# Patient Record
Sex: FEMALE | Race: WHITE | NOT HISPANIC OR LATINO | URBAN - METROPOLITAN AREA
[De-identification: names, ages, dates, MRNs, and addresses within clinical notes are randomized per-mention and may not be internally consistent; named-entity substitution may affect disease eponyms.]

---

## 2019-11-29 ENCOUNTER — OUTPATIENT (OUTPATIENT)
Dept: OUTPATIENT SERVICES | Facility: HOSPITAL | Age: 69
LOS: 1 days | End: 2019-11-29
Payer: MEDICARE

## 2019-11-29 VITALS
DIASTOLIC BLOOD PRESSURE: 82 MMHG | RESPIRATION RATE: 16 BRPM | WEIGHT: 167.99 LBS | TEMPERATURE: 98 F | OXYGEN SATURATION: 98 % | SYSTOLIC BLOOD PRESSURE: 144 MMHG | HEIGHT: 62.5 IN | HEART RATE: 84 BPM

## 2019-11-29 DIAGNOSIS — M25.511 PAIN IN RIGHT SHOULDER: ICD-10-CM

## 2019-11-29 DIAGNOSIS — Z85.828 PERSONAL HISTORY OF OTHER MALIGNANT NEOPLASM OF SKIN: Chronic | ICD-10-CM

## 2019-11-29 DIAGNOSIS — S46.011D STRAIN OF MUSCLE(S) AND TENDON(S) OF THE ROTATOR CUFF OF RIGHT SHOULDER, SUBSEQUENT ENCOUNTER: ICD-10-CM

## 2019-11-29 DIAGNOSIS — S52.122A DISPLACED FRACTURE OF HEAD OF LEFT RADIUS, INITIAL ENCOUNTER FOR CLOSED FRACTURE: Chronic | ICD-10-CM

## 2019-11-29 DIAGNOSIS — Z98.890 OTHER SPECIFIED POSTPROCEDURAL STATES: Chronic | ICD-10-CM

## 2019-11-29 DIAGNOSIS — Z01.818 ENCOUNTER FOR OTHER PREPROCEDURAL EXAMINATION: ICD-10-CM

## 2019-11-29 PROCEDURE — G0463: CPT

## 2019-11-29 NOTE — H&P PST ADULT - NSICDXPASTMEDICALHX_GEN_ALL_CORE_FT
PAST MEDICAL HISTORY:  Depression     Dyslipidemia     EKG abnormality for nuclear ST on 12/2    Hypertension     Hypothyroid     Pain in right shoulder     Rheumatoid arthritis mainly lower back area    Snores negative sleep study    Squamous cell carcinoma of skin

## 2019-11-29 NOTE — H&P PST ADULT - MUSCULOSKELETAL
details… detailed exam decreased ROM/no calf tenderness/decreased ROM due to pain/diminished strength

## 2019-11-29 NOTE — H&P PST ADULT - NSANTHOSAYNRD_GEN_A_CORE
No. ANNMARIE screening performed.  STOP BANG Legend: 0-2 = LOW Risk; 3-4 = INTERMEDIATE Risk; 5-8 = HIGH Risk

## 2019-11-29 NOTE — H&P PST ADULT - NSICDXPROBLEM_GEN_ALL_CORE_FT
PROBLEM DIAGNOSES  Problem: Pain in right shoulder  Assessment and Plan: RIGHT SHOULDER ARTHROSCOOPY  MEDICAL CLEARANCE  CARDIAC CLEARANCE  PRE OP INSTRUCTIONS      R/O PROBLEM DIAGNOSES  Problem: EKG abnormality  Assessment and Plan: STRESS TEST 12/2/19  CARDIAC CLEARANCE

## 2019-11-29 NOTE — H&P PST ADULT - NSICDXPASTSURGICALHX_GEN_ALL_CORE_FT
PAST SURGICAL HISTORY:  H/O Mohs micrographic surgery for skin cancer     H/O rhytidectomy 2000    Left radial head fracture removal of radial head 1980

## 2019-11-29 NOTE — H&P PST ADULT - ATTENDING COMMENTS
The patient has a right shoulder rotator cuff tear. I recommend a right shoulder arthroscopy with cuff repair and biceps tenotomy, with possible superior capsular reconstruction if the tear is irreparable. The risks of the procedure, including bleeding, infection, stiffness, retear, and need for additional surgery. Benefits, including decreased pain and improved function, were also reviewed. The patient understands the risks and benefits and wishes to proceed with surgery.

## 2019-11-29 NOTE — H&P PST ADULT - HISTORY OF PRESENT ILLNESS
68 yo female presents with over 2 year history of intermittent right shoulder pain.  Pain was exacerbated by a recent fall [place] the stairs at hop.  MRI was + for a rotator cuff tear.  Patient states that pain is intermittent and increases with any attempt at elevation.  No current analgesics.

## 2019-11-29 NOTE — H&P PST ADULT - NSICDXFAMILYHX_GEN_ALL_CORE_FT
FAMILY HISTORY:  Father  Still living? No  FHx: colon cancer, Age at diagnosis: Age Unknown    Sibling  Still living? No  FHx: heart disease, Age at diagnosis: Age Unknown

## 2019-12-03 PROBLEM — Z00.00 ENCOUNTER FOR PREVENTIVE HEALTH EXAMINATION: Status: ACTIVE | Noted: 2019-12-03

## 2019-12-09 ENCOUNTER — TRANSCRIPTION ENCOUNTER (OUTPATIENT)
Age: 69
End: 2019-12-09

## 2019-12-09 NOTE — ASU PATIENT PROFILE, ADULT - PMH
Depression    Dyslipidemia    EKG abnormality  for nuclear ST on 12/2  Hypertension    Hypothyroid    Pain in right shoulder    Rheumatoid arthritis  mainly lower back area  Snores  negative sleep study  Squamous cell carcinoma of skin

## 2019-12-09 NOTE — ASU PATIENT PROFILE, ADULT - PSH
H/O Mohs micrographic surgery for skin cancer    H/O rhytidectomy  2000  Left radial head fracture  removal of radial head 1980

## 2019-12-10 ENCOUNTER — OUTPATIENT (OUTPATIENT)
Dept: OUTPATIENT SERVICES | Facility: HOSPITAL | Age: 69
LOS: 1 days | End: 2019-12-10
Payer: MEDICARE

## 2019-12-10 ENCOUNTER — RESULT REVIEW (OUTPATIENT)
Age: 69
End: 2019-12-10

## 2019-12-10 VITALS
SYSTOLIC BLOOD PRESSURE: 146 MMHG | WEIGHT: 168.87 LBS | HEART RATE: 86 BPM | TEMPERATURE: 98 F | DIASTOLIC BLOOD PRESSURE: 65 MMHG | HEIGHT: 62 IN | OXYGEN SATURATION: 100 % | RESPIRATION RATE: 10 BRPM

## 2019-12-10 VITALS
SYSTOLIC BLOOD PRESSURE: 121 MMHG | RESPIRATION RATE: 11 BRPM | OXYGEN SATURATION: 97 % | HEART RATE: 82 BPM | DIASTOLIC BLOOD PRESSURE: 58 MMHG

## 2019-12-10 DIAGNOSIS — S46.011D STRAIN OF MUSCLE(S) AND TENDON(S) OF THE ROTATOR CUFF OF RIGHT SHOULDER, SUBSEQUENT ENCOUNTER: ICD-10-CM

## 2019-12-10 DIAGNOSIS — Z98.890 OTHER SPECIFIED POSTPROCEDURAL STATES: Chronic | ICD-10-CM

## 2019-12-10 DIAGNOSIS — Z85.828 PERSONAL HISTORY OF OTHER MALIGNANT NEOPLASM OF SKIN: Chronic | ICD-10-CM

## 2019-12-10 DIAGNOSIS — S52.122A DISPLACED FRACTURE OF HEAD OF LEFT RADIUS, INITIAL ENCOUNTER FOR CLOSED FRACTURE: Chronic | ICD-10-CM

## 2019-12-10 DIAGNOSIS — S48.011D: ICD-10-CM

## 2019-12-10 DIAGNOSIS — Z01.818 ENCOUNTER FOR OTHER PREPROCEDURAL EXAMINATION: ICD-10-CM

## 2019-12-10 PROCEDURE — 88304 TISSUE EXAM BY PATHOLOGIST: CPT

## 2019-12-10 PROCEDURE — 88304 TISSUE EXAM BY PATHOLOGIST: CPT | Mod: 26

## 2019-12-10 PROCEDURE — 29827 SHO ARTHRS SRG RT8TR CUF RPR: CPT | Mod: RT

## 2019-12-10 PROCEDURE — C1713: CPT

## 2019-12-10 RX ORDER — GLUCOSAMINE/MSM/CHONDROITIN A 750-375MG
0 TABLET ORAL
Qty: 0 | Refills: 0 | DISCHARGE

## 2019-12-10 RX ORDER — CHOLECALCIFEROL (VITAMIN D3) 125 MCG
1 CAPSULE ORAL
Qty: 0 | Refills: 0 | DISCHARGE

## 2019-12-10 RX ORDER — ESCITALOPRAM OXALATE 10 MG/1
1 TABLET, FILM COATED ORAL
Qty: 0 | Refills: 0 | DISCHARGE

## 2019-12-10 RX ORDER — ASCORBIC ACID 60 MG
1 TABLET,CHEWABLE ORAL
Qty: 0 | Refills: 0 | DISCHARGE

## 2019-12-10 RX ORDER — BENZOYL PEROXIDE MICRONIZED 5.8 %
1 TOWELETTE (EA) TOPICAL
Qty: 0 | Refills: 0 | DISCHARGE

## 2019-12-10 RX ORDER — UBIDECARENONE 100 MG
1 CAPSULE ORAL
Qty: 0 | Refills: 0 | DISCHARGE

## 2019-12-10 RX ORDER — OXYCODONE HYDROCHLORIDE 5 MG/1
5 TABLET ORAL ONCE
Refills: 0 | Status: DISCONTINUED | OUTPATIENT
Start: 2019-12-10 | End: 2019-12-10

## 2019-12-10 RX ORDER — APREPITANT 80 MG/1
40 CAPSULE ORAL ONCE
Refills: 0 | Status: COMPLETED | OUTPATIENT
Start: 2019-12-10 | End: 2019-12-10

## 2019-12-10 RX ORDER — CEFAZOLIN SODIUM 1 G
2000 VIAL (EA) INJECTION ONCE
Refills: 0 | Status: COMPLETED | OUTPATIENT
Start: 2019-12-10 | End: 2019-12-10

## 2019-12-10 RX ORDER — CHOLECALCIFEROL (VITAMIN D3) 125 MCG
50000 CAPSULE ORAL
Qty: 0 | Refills: 0 | DISCHARGE

## 2019-12-10 RX ORDER — ROSUVASTATIN CALCIUM 5 MG/1
1 TABLET ORAL
Qty: 0 | Refills: 0 | DISCHARGE

## 2019-12-10 RX ORDER — OMEPRAZOLE 10 MG/1
1 CAPSULE, DELAYED RELEASE ORAL
Qty: 0 | Refills: 0 | DISCHARGE

## 2019-12-10 RX ORDER — LAMOTRIGINE 25 MG/1
1 TABLET, ORALLY DISINTEGRATING ORAL
Qty: 0 | Refills: 0 | DISCHARGE

## 2019-12-10 RX ORDER — LEVOTHYROXINE SODIUM 125 MCG
1 TABLET ORAL
Qty: 0 | Refills: 0 | DISCHARGE

## 2019-12-10 RX ORDER — ONDANSETRON 8 MG/1
4 TABLET, FILM COATED ORAL ONCE
Refills: 0 | Status: DISCONTINUED | OUTPATIENT
Start: 2019-12-10 | End: 2019-12-10

## 2019-12-10 RX ORDER — NORTRIPTYLINE HYDROCHLORIDE 10 MG/1
1 CAPSULE ORAL
Qty: 0 | Refills: 0 | DISCHARGE

## 2019-12-10 RX ORDER — LANOLIN ALCOHOL/MO/W.PET/CERES
1 CREAM (GRAM) TOPICAL
Qty: 0 | Refills: 0 | DISCHARGE

## 2019-12-10 RX ORDER — RAMIPRIL 5 MG
1 CAPSULE ORAL
Qty: 0 | Refills: 0 | DISCHARGE

## 2019-12-10 RX ORDER — SODIUM CHLORIDE 9 MG/ML
1000 INJECTION, SOLUTION INTRAVENOUS
Refills: 0 | Status: DISCONTINUED | OUTPATIENT
Start: 2019-12-10 | End: 2019-12-10

## 2019-12-10 RX ORDER — VITAMIN E 100 UNIT
1 CAPSULE ORAL
Qty: 0 | Refills: 0 | DISCHARGE

## 2019-12-10 RX ORDER — HYDROMORPHONE HYDROCHLORIDE 2 MG/ML
0.5 INJECTION INTRAMUSCULAR; INTRAVENOUS; SUBCUTANEOUS
Refills: 0 | Status: DISCONTINUED | OUTPATIENT
Start: 2019-12-10 | End: 2019-12-10

## 2019-12-10 RX ORDER — FOLIC ACID 0.8 MG
1 TABLET ORAL
Qty: 0 | Refills: 0 | DISCHARGE

## 2019-12-10 RX ORDER — CHLORHEXIDINE GLUCONATE 213 G/1000ML
1 SOLUTION TOPICAL ONCE
Refills: 0 | Status: COMPLETED | OUTPATIENT
Start: 2019-12-10 | End: 2019-12-10

## 2019-12-10 RX ORDER — LACTOBACILLUS ACIDOPHILUS 100MM CELL
1 CAPSULE ORAL
Qty: 0 | Refills: 0 | DISCHARGE

## 2019-12-10 RX ORDER — TOCILIZUMAB 20 MG/ML
0 INJECTION, SOLUTION, CONCENTRATE INTRAVENOUS
Qty: 0 | Refills: 0 | DISCHARGE

## 2019-12-10 RX ADMIN — CHLORHEXIDINE GLUCONATE 1 APPLICATION(S): 213 SOLUTION TOPICAL at 11:09

## 2019-12-10 RX ADMIN — SODIUM CHLORIDE 100 MILLILITER(S): 9 INJECTION, SOLUTION INTRAVENOUS at 15:42

## 2019-12-10 RX ADMIN — APREPITANT 40 MILLIGRAM(S): 80 CAPSULE ORAL at 11:09

## 2019-12-10 NOTE — ASU DISCHARGE PLAN (ADULT/PEDIATRIC) - CARE PROVIDER_API CALL
Milo Bhatia)  Orthopaedic Surgery  [address] [place] [address]  Phone: [phone]  Fax: [phone]  Follow Up Time:

## 2019-12-10 NOTE — ASU DISCHARGE PLAN (ADULT/PEDIATRIC) - ASU DC SPECIAL INSTRUCTIONSFT
Follow instructions in the attached brochure for ice packs, bandage care, & shoulder exercises.  Elevate the right arm in sling daily.   You may take sling off to take a shower after follow up appointment.  May open and close fingers, and bend wrist up and down.  Apply ice packs to the surgical shoulder for 30 minutes every 2 or 3 hours daily.  Start Physical Therapy as prescribed as soon as possible.  Removal of stitches in 10-14 days in Dr. Bhatia's office.  Please call to arrange.    - Call your doctor if you experience:  • An increase in pain not controlled by pain medication or change in activity or position.  • Temperature greater than 101° F.  • Redness, increased swelling or foul smelling drainage from or around the incision.  • Numbness, tingling or a change in color or temperature of the operative extremity.  • Call your doctor immediately if you experience chest pain, shortness of breath or calf pain.

## 2019-12-10 NOTE — ASU DISCHARGE PLAN (ADULT/PEDIATRIC) - CALL YOUR DOCTOR IF YOU HAVE ANY OF THE FOLLOWING:
Increased irritability or sluggishness/Fever greater than (need to indicate Fahrenheit or Celsius)/Wound/Surgical Site with redness, or foul smelling discharge or pus/Numbness, tingling, color or temperature change to extremity/Excessive diarrhea/Inability to tolerate liquids or foods/Bleeding that does not stop/Nausea and vomiting that does not stop/Unable to urinate/Swelling that gets worse/Pain not relieved by Medications

## 2021-04-21 ENCOUNTER — TRANSCRIPTION ENCOUNTER (OUTPATIENT)
Age: 71
End: 2021-04-21

## 2022-03-23 NOTE — ASU PATIENT PROFILE, ADULT - NS TRANSFER DENTURES
none You can access the FollowMyHealth Patient Portal offered by [facility] by registering at the following website: [email or web address]. By joining [facility]’s FollowMyHealth portal, you will also be able to view your health information using other applications (apps) compatible with our system.

## 2022-12-07 ENCOUNTER — OFFICE (OUTPATIENT)
Dept: URBAN - METROPOLITAN AREA CLINIC 70 | Facility: CLINIC | Age: 72
Setting detail: OPHTHALMOLOGY
End: 2022-12-07
Payer: MEDICARE

## 2022-12-07 DIAGNOSIS — H16.223: ICD-10-CM

## 2022-12-07 DIAGNOSIS — H25.13: ICD-10-CM

## 2022-12-07 DIAGNOSIS — H02.403: ICD-10-CM

## 2022-12-07 PROBLEM — H40.033 NARROW ANGLE GLAUCOMA SUSPECT; BOTH EYES: Status: ACTIVE | Noted: 2022-12-07

## 2022-12-07 PROBLEM — H40.013 GLAUCOMA SUSPECT, LOW RISK 1-2 FACTORS; BOTH EYES: Status: ACTIVE | Noted: 2022-12-07

## 2022-12-07 PROCEDURE — 99213 OFFICE O/P EST LOW 20 MIN: CPT | Performed by: OPHTHALMOLOGY

## 2022-12-07 ASSESSMENT — REFRACTION_MANIFEST
OS_VA1: 20/40
OD_VA1: 20/40
OS_CYLINDER: -2.00
OS_SPHERE: +3.00
OS_ADD: +1.75
OD_ADD: +1.75
OD_AXIS: 091
OD_SPHERE: +3.00
OD_CYLINDER: -3.25
OS_AXIS: 070

## 2022-12-07 ASSESSMENT — REFRACTION_AUTOREFRACTION
OS_AXIS: 070
OD_AXIS: 091
OS_CYLINDER: -2.00
OS_SPHERE: +3.00
OD_CYLINDER: -3.75
OD_SPHERE: +3.50

## 2022-12-07 ASSESSMENT — SPHEQUIV_DERIVED
OD_SPHEQUIV: 1.375
OS_SPHEQUIV: 2
OS_SPHEQUIV: 2
OD_SPHEQUIV: 1.625

## 2022-12-07 ASSESSMENT — LID EXAM ASSESSMENTS
OS_MRD1: 3.5 MM
OS_CENTRAL_FAT_PROLAPSE: 2+
OS_COMMENTS: 2+ MEDIAL
OD_COMMENTS: 2+ MEDIAL
OS_FRONTALIS_USE: 3+
OD_COMMENTS: 2+ LATERAL
OS_COMMENTS: 2+ LATERAL FAT PROLAPS
OS_COMMENTS: 2+ MEDIAL FAT PROLAPSE
OS_COMMENTS: 2+ LATERAL
OD_MRD1: 2.5 MM
OS_LEVATOR_FUNCTION: 18 MM
OD_CENTRAL_FAT_PROLAPSE: 2+
OD_COMMENTS: 2+ LATERAL FAT PROLAPS
OD_LEVATOR_FUNCTION: 18 MM
OS_LATERAL_FAT_PROLAPSE: 2+
OD_MEDIAL_FAT_PROLAPSE: 2+
OD_LATERAL_FAT_PROLAPSE: 2+

## 2022-12-07 ASSESSMENT — KERATOMETRY
OD_K2POWER_DIOPTERS: 44.25
OS_AXISANGLE_DEGREES: 133
OS_K2POWER_DIOPTERS: 45.00
OS_K1POWER_DIOPTERS: 43.50
OD_CYLPOWER_DEGREES: 2.25
OD_K1POWER_DIOPTERS: 42.00
OS_AXISANGLE2_DEGREES: 133
OD_K2POWER_DIOPTERS: 44.25
OS_CYLAXISANGLE_DEGREES: 133
OS_CYLPOWER_DEGREES: 1.5
OD_K1K2_AVERAGE: 43.125
OS_K2POWER_DIOPTERS: 45.00
OD_K1POWER_DIOPTERS: 42.00
OS_K1K2_AVERAGE: 44.25
OD_AXISANGLE_DEGREES: 002
OS_AXISANGLE_DEGREES: 133
OD_AXISANGLE2_DEGREES: 002
OD_AXISANGLE_DEGREES: 002
OD_CYLAXISANGLE_DEGREES: 2
OS_K1POWER_DIOPTERS: 43.50

## 2022-12-07 ASSESSMENT — LID POSITION - PTOSIS
OD_PTOSIS: RUL
OS_PTOSIS: LUL

## 2022-12-07 ASSESSMENT — VISUAL ACUITY
OS_BCVA: 20/30-2
OD_BCVA: 20/40

## 2022-12-07 ASSESSMENT — DRY EYES - PHYSICIAN NOTES
OD_GENERALCOMMENTS: L
OS_GENERALCOMMENTS: L

## 2022-12-07 ASSESSMENT — REFRACTION_CURRENTRX
OS_CYLINDER: -0.75
OS_AXIS: 075
OD_CYLINDER: -0.50
OD_SPHERE: PL
OS_OVR_VA: 20/
OD_OVR_VA: 20/
OS_SPHERE: +1.50
OD_AXIS: 108

## 2022-12-07 ASSESSMENT — AXIALLENGTH_DERIVED
OS_AL: 22.5823
OS_AL: 22.5823
OD_AL: 23.2005
OD_AL: 23.1067

## 2022-12-07 ASSESSMENT — SUPERFICIAL PUNCTATE KERATITIS (SPK)
OD_SPK: 2+
OS_SPK: 2+

## 2022-12-07 ASSESSMENT — CONFRONTATIONAL VISUAL FIELD TEST (CVF)
OS_FINDINGS: FULL
OD_FINDINGS: FULL

## 2022-12-07 ASSESSMENT — LID POSITION - COMMENTS: OD_COMMENTS: MILD

## 2023-02-08 ENCOUNTER — OFFICE (OUTPATIENT)
Dept: URBAN - METROPOLITAN AREA CLINIC 70 | Facility: CLINIC | Age: 73
Setting detail: OPHTHALMOLOGY
End: 2023-02-08
Payer: MEDICARE

## 2023-02-08 DIAGNOSIS — H40.033: ICD-10-CM

## 2023-02-08 DIAGNOSIS — H40.013: ICD-10-CM

## 2023-02-08 DIAGNOSIS — H02.403: ICD-10-CM

## 2023-02-08 DIAGNOSIS — H16.223: ICD-10-CM

## 2023-02-08 DIAGNOSIS — H25.13: ICD-10-CM

## 2023-02-08 PROCEDURE — 99213 OFFICE O/P EST LOW 20 MIN: CPT | Performed by: OPHTHALMOLOGY

## 2023-02-08 ASSESSMENT — REFRACTION_MANIFEST
OS_ADD: +1.75
OS_VA1: 20/40
OS_SPHERE: +3.00
OD_ADD: +1.75
OD_VA1: 20/40
OS_CYLINDER: -2.00
OD_SPHERE: +3.00
OD_AXIS: 091
OS_AXIS: 070
OD_CYLINDER: -3.25

## 2023-02-08 ASSESSMENT — KERATOMETRY
OS_AXISANGLE_DEGREES: 144
OD_K2POWER_DIOPTERS: 44.00
OS_K1POWER_DIOPTERS: 43.50
OS_K2POWER_DIOPTERS: 44.75
OD_AXISANGLE_DEGREES: 009
OD_K1POWER_DIOPTERS: 43.25

## 2023-02-08 ASSESSMENT — LID EXAM ASSESSMENTS
OD_COMMENTS: 2+ LATERAL
OS_MRD1: 3.5 MM
OS_LEVATOR_FUNCTION: 18 MM
OS_LATERAL_FAT_PROLAPSE: 2+
OS_COMMENTS: 2+ MEDIAL
OD_COMMENTS: 2+ LATERAL FAT PROLAPS
OS_COMMENTS: 2+ LATERAL FAT PROLAPS
OS_COMMENTS: 2+ MEDIAL FAT PROLAPSE
OS_COMMENTS: 2+ LATERAL
OS_CENTRAL_FAT_PROLAPSE: 2+
OS_FRONTALIS_USE: 3+
OD_LATERAL_FAT_PROLAPSE: 2+
OD_COMMENTS: 2+ MEDIAL
OD_LEVATOR_FUNCTION: 18 MM
OD_MEDIAL_FAT_PROLAPSE: 2+
OD_MRD1: 2.5 MM
OD_CENTRAL_FAT_PROLAPSE: 2+

## 2023-02-08 ASSESSMENT — REFRACTION_AUTOREFRACTION
OS_CYLINDER: -2.25
OS_SPHERE: ++3.00
OD_SPHERE: +1.75
OD_AXIS: 96
OS_AXIS: 76
OD_CYLINDER: -2.25

## 2023-02-08 ASSESSMENT — DRY EYES - PHYSICIAN NOTES
OD_GENERALCOMMENTS: L
OS_GENERALCOMMENTS: L

## 2023-02-08 ASSESSMENT — LID POSITION - PTOSIS
OD_PTOSIS: RUL
OS_PTOSIS: LUL

## 2023-02-08 ASSESSMENT — SUPERFICIAL PUNCTATE KERATITIS (SPK)
OS_SPK: 2+
OD_SPK: 2+

## 2023-02-08 ASSESSMENT — REFRACTION_CURRENTRX
OS_OVR_VA: 20/
OS_AXIS: 075
OD_SPHERE: PL
OD_CYLINDER: -0.50
OD_OVR_VA: 20/
OS_SPHERE: +1.50
OD_AXIS: 108
OS_CYLINDER: -0.75

## 2023-02-08 ASSESSMENT — LID POSITION - COMMENTS: OD_COMMENTS: MILD

## 2023-02-08 ASSESSMENT — VISUAL ACUITY
OD_BCVA: 20/60
OS_BCVA: 20/40+2

## 2023-02-08 ASSESSMENT — AXIALLENGTH_DERIVED
OS_AL: 22.6245
OD_AL: 23.0243
OD_AL: 23.306

## 2023-02-08 ASSESSMENT — SPHEQUIV_DERIVED
OD_SPHEQUIV: 0.625
OD_SPHEQUIV: 1.375
OS_SPHEQUIV: 2

## 2023-02-08 ASSESSMENT — CONFRONTATIONAL VISUAL FIELD TEST (CVF)
OS_FINDINGS: FULL
OD_FINDINGS: FULL

## 2023-02-22 ENCOUNTER — NON-APPOINTMENT (OUTPATIENT)
Age: 73
End: 2023-02-22

## 2023-02-23 ENCOUNTER — TRANSCRIPTION ENCOUNTER (OUTPATIENT)
Age: 73
End: 2023-02-23

## 2023-03-13 ENCOUNTER — OFFICE (OUTPATIENT)
Dept: URBAN - METROPOLITAN AREA CLINIC 104 | Facility: CLINIC | Age: 73
Setting detail: OPHTHALMOLOGY
End: 2023-03-13
Payer: MEDICARE

## 2023-03-13 DIAGNOSIS — H53.2: ICD-10-CM

## 2023-03-13 PROCEDURE — 92015 DETERMINE REFRACTIVE STATE: CPT | Performed by: SPECIALIST

## 2023-03-13 PROCEDURE — 99213 OFFICE O/P EST LOW 20 MIN: CPT | Performed by: SPECIALIST

## 2023-03-13 ASSESSMENT — LID POSITION - PTOSIS
OD_PTOSIS: RUL
OS_PTOSIS: LUL

## 2023-03-13 ASSESSMENT — REFRACTION_MANIFEST
OS_SPHERE: +2.00
OS_VA1: 20/30-
OS_CYLINDER: -1.00
OS_HPRISM: 2
OD_VA1: 20/25+
OS_VPRISM_DIRECTION: BI
OD_SPHERE: PLANO
OS_AXIS: 90
OD_VA2: 20/20(J1+)
OD_AXIS: 105
OS_VA2: 20/20(J1+)
OD_CYLINDER: -1.00

## 2023-03-13 ASSESSMENT — LID EXAM ASSESSMENTS
OD_MRD1: 2.5 MM
OD_COMMENTS: 2+ LATERAL FAT PROLAPS
OD_COMMENTS: 2+ MEDIAL
OS_FRONTALIS_USE: 3+
OS_COMMENTS: 2+ LATERAL FAT PROLAPS
OS_MRD1: 3.5 MM
OD_LATERAL_FAT_PROLAPSE: 2+
OD_COMMENTS: 2+ LATERAL
OS_COMMENTS: 2+ MEDIAL FAT PROLAPSE
OS_CENTRAL_FAT_PROLAPSE: 2+
OS_COMMENTS: 2+ LATERAL
OS_LATERAL_FAT_PROLAPSE: 2+
OS_LEVATOR_FUNCTION: 18 MM
OD_CENTRAL_FAT_PROLAPSE: 2+
OD_LEVATOR_FUNCTION: 18 MM
OD_MEDIAL_FAT_PROLAPSE: 2+
OS_COMMENTS: 2+ MEDIAL

## 2023-03-13 ASSESSMENT — SPHEQUIV_DERIVED
OS_SPHEQUIV: 1.5
OS_SPHEQUIV: 1.125
OD_SPHEQUIV: 1.25

## 2023-03-13 ASSESSMENT — REFRACTION_AUTOREFRACTION
OD_CYLINDER: -3.00
OS_AXIS: 072
OD_SPHERE: +2.75
OS_SPHERE: +2.25
OS_CYLINDER: -2.25
OD_AXIS: 090

## 2023-03-13 ASSESSMENT — DRY EYES - PHYSICIAN NOTES
OD_GENERALCOMMENTS: L
OS_GENERALCOMMENTS: L

## 2023-03-13 ASSESSMENT — VISUAL ACUITY
OS_BCVA: 20/40+2
OD_BCVA: 20/30

## 2023-03-13 ASSESSMENT — REFRACTION_CURRENTRX
OD_AXIS: 110
OS_SPHERE: +1.50
OD_SPHERE: PLANO
OS_OVR_VA: 20/
OD_OVR_VA: 20/
OS_AXIS: 80
OS_CYLINDER: -0.75
OD_CYLINDER: -0.50

## 2023-03-13 ASSESSMENT — SUPERFICIAL PUNCTATE KERATITIS (SPK)
OD_SPK: 2+
OS_SPK: 2+

## 2023-03-13 ASSESSMENT — LID POSITION - COMMENTS: OD_COMMENTS: MILD

## 2023-03-13 ASSESSMENT — CONFRONTATIONAL VISUAL FIELD TEST (CVF)
OS_FINDINGS: FULL
OD_FINDINGS: FULL

## 2023-04-25 ENCOUNTER — NON-APPOINTMENT (OUTPATIENT)
Age: 73
End: 2023-04-25

## 2023-05-05 PROBLEM — M25.511 PAIN IN RIGHT SHOULDER: Chronic | Status: ACTIVE | Noted: 2019-11-29

## 2023-05-05 PROBLEM — C44.92 SQUAMOUS CELL CARCINOMA OF SKIN, UNSPECIFIED: Chronic | Status: ACTIVE | Noted: 2019-11-29

## 2023-05-05 PROBLEM — F32.9 MAJOR DEPRESSIVE DISORDER, SINGLE EPISODE, UNSPECIFIED: Chronic | Status: ACTIVE | Noted: 2019-11-29

## 2023-05-05 PROBLEM — I10 ESSENTIAL (PRIMARY) HYPERTENSION: Chronic | Status: ACTIVE | Noted: 2019-11-29

## 2023-05-05 PROBLEM — E78.5 HYPERLIPIDEMIA, UNSPECIFIED: Chronic | Status: ACTIVE | Noted: 2019-11-29

## 2023-05-05 PROBLEM — R94.31 ABNORMAL ELECTROCARDIOGRAM [ECG] [EKG]: Chronic | Status: ACTIVE | Noted: 2019-11-29

## 2023-05-05 PROBLEM — R06.83 SNORING: Chronic | Status: ACTIVE | Noted: 2019-11-29

## 2023-05-05 PROBLEM — M06.9 RHEUMATOID ARTHRITIS, UNSPECIFIED: Chronic | Status: ACTIVE | Noted: 2019-11-29

## 2023-05-05 PROBLEM — E03.9 HYPOTHYROIDISM, UNSPECIFIED: Chronic | Status: ACTIVE | Noted: 2019-11-29

## 2023-06-24 ENCOUNTER — OFFICE (OUTPATIENT)
Dept: URBAN - METROPOLITAN AREA CLINIC 70 | Facility: CLINIC | Age: 73
Setting detail: OPHTHALMOLOGY
End: 2023-06-24
Payer: MEDICARE

## 2023-06-24 DIAGNOSIS — H40.033: ICD-10-CM

## 2023-06-24 DIAGNOSIS — H16.223: ICD-10-CM

## 2023-06-24 DIAGNOSIS — H40.013: ICD-10-CM

## 2023-06-24 DIAGNOSIS — H25.13: ICD-10-CM

## 2023-06-24 DIAGNOSIS — H02.403: ICD-10-CM

## 2023-06-24 PROCEDURE — 92133 CPTRZD OPH DX IMG PST SGM ON: CPT | Performed by: OPHTHALMOLOGY

## 2023-06-24 PROCEDURE — 99213 OFFICE O/P EST LOW 20 MIN: CPT | Performed by: OPHTHALMOLOGY

## 2023-06-24 ASSESSMENT — LID EXAM ASSESSMENTS
OD_COMMENTS: 2+ MEDIAL
OS_LATERAL_FAT_PROLAPSE: 2+
OS_COMMENTS: 2+ LATERAL
OS_CENTRAL_FAT_PROLAPSE: 2+
OD_CENTRAL_FAT_PROLAPSE: 2+
OS_COMMENTS: 2+ LATERAL FAT PROLAPS
OD_LATERAL_FAT_PROLAPSE: 2+
OS_MRD1: 3.5 MM
OS_COMMENTS: 2+ MEDIAL
OD_MRD1: 2.5 MM
OS_LEVATOR_FUNCTION: 18 MM
OD_LEVATOR_FUNCTION: 18 MM
OD_COMMENTS: 2+ LATERAL
OD_MEDIAL_FAT_PROLAPSE: 2+
OS_COMMENTS: 2+ MEDIAL FAT PROLAPSE
OS_FRONTALIS_USE: 3+
OD_COMMENTS: 2+ LATERAL FAT PROLAPS

## 2023-06-24 ASSESSMENT — REFRACTION_CURRENTRX
OS_SPHERE: +1.50
OD_OVR_VA: 20/
OS_OVR_VA: 20/
OD_AXIS: 110
OS_AXIS: 80
OD_SPHERE: PLANO
OD_CYLINDER: -0.50
OS_CYLINDER: -0.75

## 2023-06-24 ASSESSMENT — KERATOMETRY
OS_K1POWER_DIOPTERS: 43.75
OS_K2POWER_DIOPTERS: 45.00
OS_AXISANGLE_DEGREES: 134
OD_AXISANGLE_DEGREES: 013
OD_K1POWER_DIOPTERS: 43.50
OD_K2POWER_DIOPTERS: 44.75

## 2023-06-24 ASSESSMENT — REFRACTION_AUTOREFRACTION
OS_CYLINDER: -1.50
OS_AXIS: 076
OS_SPHERE: +2.50
OD_CYLINDER: -1.50
OD_AXIS: 089
OD_SPHERE: +1.00

## 2023-06-24 ASSESSMENT — SUPERFICIAL PUNCTATE KERATITIS (SPK)
OS_SPK: 2+
OD_SPK: 2+

## 2023-06-24 ASSESSMENT — REFRACTION_MANIFEST
OD_SPHERE: +0.50
OS_VA1: 20/30-
OS_CYLINDER: -1.00
OD_VA2: 20/20(J1+)
OS_VPRISM_DIRECTION: BI
OS_CYLINDER: -1.25
OD_AXIS: 090
OS_SPHERE: +1.75
OD_VA1: 20/25+
OS_SPHERE: +2.00
OS_AXIS: 075
OD_VA1: 20/40
OS_VA1: 20/60
OS_VA2: 20/20(J1+)
OS_AXIS: 90
OD_CYLINDER: -1.00
OD_AXIS: 105
OD_CYLINDER: -1.25
OD_SPHERE: PLANO
OS_HPRISM: 2

## 2023-06-24 ASSESSMENT — LID POSITION - PTOSIS
OD_PTOSIS: RUL
OS_PTOSIS: LUL

## 2023-06-24 ASSESSMENT — SPHEQUIV_DERIVED
OS_SPHEQUIV: 1.125
OS_SPHEQUIV: 1.5
OD_SPHEQUIV: -0.125
OD_SPHEQUIV: 0.25
OS_SPHEQUIV: 1.75

## 2023-06-24 ASSESSMENT — DRY EYES - PHYSICIAN NOTES
OS_GENERALCOMMENTS: L
OD_GENERALCOMMENTS: L

## 2023-06-24 ASSESSMENT — AXIALLENGTH_DERIVED
OD_AL: 23.2694
OS_AL: 22.6296
OS_AL: 22.7196
OD_AL: 23.4124
OS_AL: 22.8559

## 2023-06-24 ASSESSMENT — VISUAL ACUITY
OS_BCVA: 20/50-2
OD_BCVA: 20/50-2

## 2023-06-24 ASSESSMENT — CONFRONTATIONAL VISUAL FIELD TEST (CVF)
OS_FINDINGS: FULL
OD_FINDINGS: FULL

## 2023-06-24 ASSESSMENT — LID POSITION - COMMENTS: OD_COMMENTS: MILD

## 2023-07-24 ENCOUNTER — RX ONLY (RX ONLY)
Age: 73
End: 2023-07-24

## 2023-07-24 ENCOUNTER — OFFICE (OUTPATIENT)
Dept: URBAN - METROPOLITAN AREA CLINIC 104 | Facility: CLINIC | Age: 73
Setting detail: OPHTHALMOLOGY
End: 2023-07-24
Payer: MEDICARE

## 2023-07-24 DIAGNOSIS — H16.223: ICD-10-CM

## 2023-07-24 DIAGNOSIS — H02.403: ICD-10-CM

## 2023-07-24 DIAGNOSIS — H40.013: ICD-10-CM

## 2023-07-24 DIAGNOSIS — H25.12: ICD-10-CM

## 2023-07-24 DIAGNOSIS — H40.033: ICD-10-CM

## 2023-07-24 DIAGNOSIS — H25.13: ICD-10-CM

## 2023-07-24 PROBLEM — H25.11 CATARACT SENILE NUCLEAR SCLEROSIS; RIGHT EYE, LEFT EYE, BOTH EYES: Status: ACTIVE | Noted: 2023-07-24

## 2023-07-24 PROCEDURE — 92136 OPHTHALMIC BIOMETRY: CPT | Performed by: OPHTHALMOLOGY

## 2023-07-24 PROCEDURE — 99214 OFFICE O/P EST MOD 30 MIN: CPT | Performed by: OPHTHALMOLOGY

## 2023-07-24 ASSESSMENT — KERATOMETRY
OS_K2POWER_DIOPTERS: 44.29
OD_CYLAXISANGLE_DEGREES: 62
OS_CYLAXISANGLE_DEGREES: 177
OS_K1POWER_DIOPTERS: 43.05
OD_AXISANGLE2_DEGREES: 062
OS_K1K2_AVERAGE: 43.67
OS_K2POWER_DIOPTERS: 44.29
OD_AXISANGLE_DEGREES: 062
OD_K2POWER_DIOPTERS: 44.76
OD_K1POWER_DIOPTERS: 44.53
OS_K1POWER_DIOPTERS: 43.05
OD_K1K2_AVERAGE: 44.65
OD_K1POWER_DIOPTERS: 44.53
OS_AXISANGLE2_DEGREES: 177
OD_AXISANGLE_DEGREES: 062
OD_CYLPOWER_DEGREES: 0.23
OS_AXISANGLE_DEGREES: 177
OD_K2POWER_DIOPTERS: 44.76
OS_AXISANGLE_DEGREES: 177
OS_CYLPOWER_DEGREES: 1.24

## 2023-07-24 ASSESSMENT — REFRACTION_MANIFEST
OS_ADD: +1.50
OS_SPHERE: +1.00
OD_SPHERE: +0.50
OS_VA1: 20/30-
OD_AXIS: 095
OS_CYLINDER: -1.25
OD_ADD: +1.50
OD_VA1: 20/30-
OD_CYLINDER: -1.25
OS_AXIS: 090

## 2023-07-24 ASSESSMENT — REFRACTION_AUTOREFRACTION
OS_SPHERE: +2.75
OS_AXIS: 092
OD_AXIS: 094
OD_SPHERE: +0.50
OS_CYLINDER: -1.50
OD_CYLINDER: -1.75

## 2023-07-24 ASSESSMENT — REFRACTION_CURRENTRX
OD_CYLINDER: -0.50
OS_CYLINDER: -0.75
OD_SPHERE: PLANO
OD_OVR_VA: 20/
OS_SPHERE: +1.50
OD_AXIS: 108
OS_AXIS: 078
OS_OVR_VA: 20/

## 2023-07-24 ASSESSMENT — LID EXAM ASSESSMENTS
OD_MRD1: 2.5 MM
OS_COMMENTS: 2+ LATERAL
OS_COMMENTS: 2+ LATERAL FAT PROLAPS
OD_MEDIAL_FAT_PROLAPSE: 2+
OS_COMMENTS: 2+ MEDIAL FAT PROLAPSE
OS_LEVATOR_FUNCTION: 18 MM
OS_CENTRAL_FAT_PROLAPSE: 2+
OD_COMMENTS: 2+ MEDIAL
OS_FRONTALIS_USE: 3+
OD_COMMENTS: 2+ LATERAL FAT PROLAPS
OS_LATERAL_FAT_PROLAPSE: 2+
OD_LEVATOR_FUNCTION: 18 MM
OS_COMMENTS: 2+ MEDIAL
OD_CENTRAL_FAT_PROLAPSE: 2+
OD_LATERAL_FAT_PROLAPSE: 2+
OS_MRD1: 3.5 MM
OD_COMMENTS: 2+ LATERAL

## 2023-07-24 ASSESSMENT — SPHEQUIV_DERIVED
OS_SPHEQUIV: 2
OD_SPHEQUIV: -0.125
OD_SPHEQUIV: -0.375
OS_SPHEQUIV: 0.375

## 2023-07-24 ASSESSMENT — TONOMETRY
OS_IOP_MMHG: 17
OD_IOP_MMHG: 17

## 2023-07-24 ASSESSMENT — SUPERFICIAL PUNCTATE KERATITIS (SPK)
OS_SPK: 2+
OD_SPK: 2+

## 2023-07-24 ASSESSMENT — CONFRONTATIONAL VISUAL FIELD TEST (CVF)
OD_FINDINGS: FULL
OS_FINDINGS: FULL

## 2023-07-24 ASSESSMENT — AXIALLENGTH_DERIVED
OD_AL: 23.23
OS_AL: 23.39
OD_AL: 23.32
OS_AL: 22.7792

## 2023-07-24 ASSESSMENT — LID POSITION - PTOSIS
OS_PTOSIS: LUL
OD_PTOSIS: RUL

## 2023-07-24 ASSESSMENT — DRY EYES - PHYSICIAN NOTES
OS_GENERALCOMMENTS: L
OD_GENERALCOMMENTS: L

## 2023-07-24 ASSESSMENT — VISUAL ACUITY
OS_BCVA: 20/40
OD_BCVA: 20/40

## 2023-07-24 ASSESSMENT — LID POSITION - COMMENTS: OD_COMMENTS: MILD

## 2023-08-10 ENCOUNTER — RX ONLY (RX ONLY)
Age: 73
End: 2023-08-10

## 2023-08-11 ENCOUNTER — ASC (OUTPATIENT)
Dept: URBAN - METROPOLITAN AREA SURGERY 8 | Facility: SURGERY | Age: 73
Setting detail: OPHTHALMOLOGY
End: 2023-08-11
Payer: MEDICARE

## 2023-08-11 DIAGNOSIS — H25.12: ICD-10-CM

## 2023-08-11 PROCEDURE — 68841 INSJ RX ELUT IMPLT LAC CANAL: CPT | Performed by: OPHTHALMOLOGY

## 2023-08-11 PROCEDURE — 66984 XCAPSL CTRC RMVL W/O ECP: CPT | Performed by: OPHTHALMOLOGY

## 2023-08-12 ENCOUNTER — OFFICE (OUTPATIENT)
Dept: URBAN - METROPOLITAN AREA CLINIC 104 | Facility: CLINIC | Age: 73
Setting detail: OPHTHALMOLOGY
End: 2023-08-12
Payer: MEDICARE

## 2023-08-12 DIAGNOSIS — Z96.1: ICD-10-CM

## 2023-08-12 PROCEDURE — 99024 POSTOP FOLLOW-UP VISIT: CPT | Performed by: OPTOMETRIST

## 2023-08-12 ASSESSMENT — LID EXAM ASSESSMENTS
OD_COMMENTS: 2+ MEDIAL
OD_LATERAL_FAT_PROLAPSE: 2+
OS_LEVATOR_FUNCTION: 18 MM
OS_LATERAL_FAT_PROLAPSE: 2+
OD_COMMENTS: 2+ LATERAL
OS_COMMENTS: 2+ LATERAL
OS_COMMENTS: 2+ MEDIAL FAT PROLAPSE
OS_CENTRAL_FAT_PROLAPSE: 2+
OS_COMMENTS: 2+ LATERAL FAT PROLAPS
OD_CENTRAL_FAT_PROLAPSE: 2+
OD_COMMENTS: 2+ LATERAL FAT PROLAPS
OS_COMMENTS: 2+ MEDIAL
OS_MRD1: 3.5 MM
OD_LEVATOR_FUNCTION: 18 MM
OD_MEDIAL_FAT_PROLAPSE: 2+
OS_FRONTALIS_USE: 3+
OD_MRD1: 2.5 MM

## 2023-08-12 ASSESSMENT — TONOMETRY: OS_IOP_MMHG: 17

## 2023-08-12 ASSESSMENT — LID POSITION - PTOSIS
OS_PTOSIS: LUL
OD_PTOSIS: RUL

## 2023-08-12 ASSESSMENT — CORNEAL EDEMA CLINICAL DESCRIPTION: OS_CORNEALEDEMA: 1+

## 2023-08-12 ASSESSMENT — REFRACTION_AUTOREFRACTION
OD_CYLINDER: -1.75
OS_AXIS: 066
OD_SPHERE: +0.75
OS_SPHERE: +1.75
OS_CYLINDER: -1.25
OD_AXIS: 083

## 2023-08-12 ASSESSMENT — SPHEQUIV_DERIVED
OS_SPHEQUIV: 1.125
OD_SPHEQUIV: -0.125

## 2023-08-12 ASSESSMENT — AXIALLENGTH_DERIVED
OD_AL: 23.224
OS_AL: 22.97

## 2023-08-12 ASSESSMENT — VISUAL ACUITY
OS_BCVA: 20/40
OD_BCVA: 20/50-1

## 2023-08-12 ASSESSMENT — DRY EYES - PHYSICIAN NOTES
OS_GENERALCOMMENTS: L
OD_GENERALCOMMENTS: L

## 2023-08-12 ASSESSMENT — SUPERFICIAL PUNCTATE KERATITIS (SPK)
OD_SPK: 2+
OS_SPK: 2+

## 2023-08-12 ASSESSMENT — LID POSITION - COMMENTS: OD_COMMENTS: MILD

## 2023-08-12 ASSESSMENT — KERATOMETRY
OD_K2POWER_DIOPTERS: 45.12
OD_K1POWER_DIOPTERS: 44.18
OS_K2POWER_DIOPTERS: 44.35
OS_K1POWER_DIOPTERS: 43.72
OD_AXISANGLE_DEGREES: 011
OS_AXISANGLE_DEGREES: 147

## 2023-08-12 ASSESSMENT — CONFRONTATIONAL VISUAL FIELD TEST (CVF)
OD_FINDINGS: FULL
OS_FINDINGS: FULL

## 2023-08-14 ENCOUNTER — OFFICE (OUTPATIENT)
Dept: URBAN - METROPOLITAN AREA CLINIC 104 | Facility: CLINIC | Age: 73
Setting detail: OPHTHALMOLOGY
End: 2023-08-14
Payer: MEDICARE

## 2023-08-14 DIAGNOSIS — Z96.1: ICD-10-CM

## 2023-08-14 PROBLEM — H25.11 CATARACT SENILE NUCLEAR SCLEROSIS; RIGHT EYE,: Status: ACTIVE | Noted: 2023-08-12

## 2023-08-14 PROBLEM — H25.811 CATARACT SENILE NUCLEAR SCLEROSIS; RIGHT EYE,: Status: ACTIVE | Noted: 2023-08-12

## 2023-08-14 PROCEDURE — 99024 POSTOP FOLLOW-UP VISIT: CPT | Performed by: OPTOMETRIST

## 2023-08-14 ASSESSMENT — LID EXAM ASSESSMENTS
OS_MRD1: 3.5 MM
OD_COMMENTS: 2+ MEDIAL
OS_COMMENTS: 2+ LATERAL
OD_LEVATOR_FUNCTION: 18 MM
OS_COMMENTS: 2+ LATERAL FAT PROLAPS
OD_LATERAL_FAT_PROLAPSE: 2+
OD_CENTRAL_FAT_PROLAPSE: 2+
OS_COMMENTS: 2+ MEDIAL
OS_FRONTALIS_USE: 3+
OD_MRD1: 2.5 MM
OD_COMMENTS: 2+ LATERAL
OS_LEVATOR_FUNCTION: 18 MM
OS_CENTRAL_FAT_PROLAPSE: 2+
OS_COMMENTS: 2+ MEDIAL FAT PROLAPSE
OD_COMMENTS: 2+ LATERAL FAT PROLAPS
OD_MEDIAL_FAT_PROLAPSE: 2+
OS_LATERAL_FAT_PROLAPSE: 2+

## 2023-08-14 ASSESSMENT — KERATOMETRY
OD_AXISANGLE_DEGREES: 035
OS_AXISANGLE_DEGREES: 148
OD_K1POWER_DIOPTERS: 43.89
OS_K1POWER_DIOPTERS: 43.66
OS_K2POWER_DIOPTERS: 44.53
OD_K2POWER_DIOPTERS: 45.12

## 2023-08-14 ASSESSMENT — SPHEQUIV_DERIVED
OD_SPHEQUIV: -0.625
OS_SPHEQUIV: 0.75

## 2023-08-14 ASSESSMENT — LID POSITION - PTOSIS
OD_PTOSIS: RUL
OS_PTOSIS: LUL

## 2023-08-14 ASSESSMENT — REFRACTION_AUTOREFRACTION
OD_CYLINDER: -0.75
OS_CYLINDER: -1.00
OD_SPHERE: -0.25
OS_AXIS: 064
OS_SPHERE: +1.25
OD_AXIS: 074

## 2023-08-14 ASSESSMENT — VISUAL ACUITY
OD_BCVA: 20/30-2
OS_BCVA: 20/40

## 2023-08-14 ASSESSMENT — LID POSITION - COMMENTS: OD_COMMENTS: MILD

## 2023-08-14 ASSESSMENT — AXIALLENGTH_DERIVED
OD_AL: 23.47
OS_AL: 23.092

## 2023-08-14 ASSESSMENT — DRY EYES - PHYSICIAN NOTES
OD_GENERALCOMMENTS: L
OS_GENERALCOMMENTS: L

## 2023-08-14 ASSESSMENT — CORNEAL EDEMA CLINICAL DESCRIPTION: OS_CORNEALEDEMA: T

## 2023-08-14 ASSESSMENT — SUPERFICIAL PUNCTATE KERATITIS (SPK)
OD_SPK: 2+
OS_SPK: 2+

## 2023-08-14 ASSESSMENT — TONOMETRY: OS_IOP_MMHG: 16

## 2023-09-11 ENCOUNTER — OFFICE (OUTPATIENT)
Dept: URBAN - METROPOLITAN AREA CLINIC 104 | Facility: CLINIC | Age: 73
Setting detail: OPHTHALMOLOGY
End: 2023-09-11
Payer: MEDICARE

## 2023-09-11 DIAGNOSIS — Z96.1: ICD-10-CM

## 2023-09-11 PROCEDURE — 99024 POSTOP FOLLOW-UP VISIT: CPT | Performed by: OPHTHALMOLOGY

## 2023-09-11 ASSESSMENT — CONFRONTATIONAL VISUAL FIELD TEST (CVF)
OD_FINDINGS: FULL
OS_FINDINGS: FULL

## 2023-09-11 ASSESSMENT — REFRACTION_AUTOREFRACTION
OD_AXIS: 059
OS_CYLINDER: -2.00
OS_SPHERE: +1.50
OD_SPHERE: -0.50
OD_CYLINDER: -0.50
OS_AXIS: 093

## 2023-09-11 ASSESSMENT — LID EXAM ASSESSMENTS
OD_COMMENTS: 2+ LATERAL
OS_MRD1: 3.5 MM
OS_LEVATOR_FUNCTION: 18 MM
OS_COMMENTS: 2+ MEDIAL
OS_FRONTALIS_USE: 3+
OS_CENTRAL_FAT_PROLAPSE: 2+
OD_MRD1: 2.5 MM
OS_COMMENTS: 2+ MEDIAL FAT PROLAPSE
OD_COMMENTS: 2+ MEDIAL
OD_LATERAL_FAT_PROLAPSE: 2+
OD_COMMENTS: 2+ LATERAL FAT PROLAPS
OS_LATERAL_FAT_PROLAPSE: 2+
OS_COMMENTS: 2+ LATERAL FAT PROLAPS
OD_CENTRAL_FAT_PROLAPSE: 2+
OD_LEVATOR_FUNCTION: 18 MM
OD_MEDIAL_FAT_PROLAPSE: 2+
OS_COMMENTS: 2+ LATERAL

## 2023-09-11 ASSESSMENT — AXIALLENGTH_DERIVED
OD_AL: 23.38
OS_AL: 23.25

## 2023-09-11 ASSESSMENT — SUPERFICIAL PUNCTATE KERATITIS (SPK)
OD_SPK: 2+
OS_SPK: 2+

## 2023-09-11 ASSESSMENT — LID POSITION - PTOSIS
OS_PTOSIS: LUL
OD_PTOSIS: RUL

## 2023-09-11 ASSESSMENT — KERATOMETRY
OS_AXISANGLE_DEGREES: 164
OD_K1POWER_DIOPTERS: 44.47
OD_K2POWER_DIOPTERS: 45.30
OD_AXISANGLE_DEGREES: 019
OS_K1POWER_DIOPTERS: 43.77
OS_K2POWER_DIOPTERS: 44.06

## 2023-09-11 ASSESSMENT — TONOMETRY
OS_IOP_MMHG: 17
OD_IOP_MMHG: 16

## 2023-09-11 ASSESSMENT — VISUAL ACUITY
OS_BCVA: 20/40
OD_BCVA: 20/25

## 2023-09-11 ASSESSMENT — SPHEQUIV_DERIVED
OS_SPHEQUIV: 0.5
OD_SPHEQUIV: -0.75

## 2023-09-11 ASSESSMENT — LID POSITION - COMMENTS: OD_COMMENTS: MILD

## 2023-09-11 ASSESSMENT — DRY EYES - PHYSICIAN NOTES
OS_GENERALCOMMENTS: L
OD_GENERALCOMMENTS: L

## 2023-09-11 ASSESSMENT — CORNEAL EDEMA CLINICAL DESCRIPTION: OS_CORNEALEDEMA: T

## 2023-09-28 ENCOUNTER — RX ONLY (RX ONLY)
Age: 73
End: 2023-09-28

## 2023-09-29 ENCOUNTER — ASC (OUTPATIENT)
Dept: URBAN - METROPOLITAN AREA SURGERY 8 | Facility: SURGERY | Age: 73
Setting detail: OPHTHALMOLOGY
End: 2023-09-29
Payer: MEDICARE

## 2023-09-29 DIAGNOSIS — H25.11: ICD-10-CM

## 2023-09-29 PROCEDURE — 66984 XCAPSL CTRC RMVL W/O ECP: CPT | Performed by: OPHTHALMOLOGY

## 2023-09-30 ENCOUNTER — OFFICE (OUTPATIENT)
Dept: URBAN - METROPOLITAN AREA CLINIC 104 | Facility: CLINIC | Age: 73
Setting detail: OPHTHALMOLOGY
End: 2023-09-30
Payer: MEDICARE

## 2023-09-30 DIAGNOSIS — Z96.1: ICD-10-CM

## 2023-09-30 PROCEDURE — 99024 POSTOP FOLLOW-UP VISIT: CPT | Performed by: OPTOMETRIST

## 2023-09-30 ASSESSMENT — VISUAL ACUITY
OS_BCVA: 20/40
OD_BCVA: 20/30

## 2023-09-30 ASSESSMENT — REFRACTION_AUTOREFRACTION
OS_CYLINDER: -2.25
OD_AXIS: 60
OD_CYLINDER: -1.00
OS_SPHERE: +2.75
OD_SPHERE: +0.50
OS_AXIS: 88

## 2023-09-30 ASSESSMENT — LID POSITION - COMMENTS: OD_COMMENTS: MILD

## 2023-09-30 ASSESSMENT — LID EXAM ASSESSMENTS
OS_COMMENTS: 2+ MEDIAL
OD_MEDIAL_FAT_PROLAPSE: 2+
OD_MRD1: 2.5 MM
OS_FRONTALIS_USE: 3+
OD_LEVATOR_FUNCTION: 18 MM
OD_LATERAL_FAT_PROLAPSE: 2+
OD_COMMENTS: 2+ LATERAL FAT PROLAPS
OD_COMMENTS: 2+ LATERAL
OS_COMMENTS: 2+ MEDIAL FAT PROLAPSE
OD_CENTRAL_FAT_PROLAPSE: 2+
OS_LEVATOR_FUNCTION: 18 MM
OS_CENTRAL_FAT_PROLAPSE: 2+
OS_COMMENTS: 2+ LATERAL
OS_MRD1: 3.5 MM
OS_COMMENTS: 2+ LATERAL FAT PROLAPS
OS_LATERAL_FAT_PROLAPSE: 2+
OD_COMMENTS: 2+ MEDIAL

## 2023-09-30 ASSESSMENT — CORNEAL EDEMA CLINICAL DESCRIPTION
OS_CORNEALEDEMA: T
OD_CORNEALEDEMA: T

## 2023-09-30 ASSESSMENT — SUPERFICIAL PUNCTATE KERATITIS (SPK)
OS_SPK: 2+
OD_SPK: 2+

## 2023-09-30 ASSESSMENT — LID POSITION - PTOSIS
OD_PTOSIS: RUL
OS_PTOSIS: LUL

## 2023-09-30 ASSESSMENT — DRY EYES - PHYSICIAN NOTES
OS_GENERALCOMMENTS: L
OD_GENERALCOMMENTS: L

## 2023-09-30 ASSESSMENT — SPHEQUIV_DERIVED
OD_SPHEQUIV: 0
OS_SPHEQUIV: 1.625

## 2023-09-30 ASSESSMENT — TONOMETRY: OD_IOP_MMHG: 17

## 2023-10-12 ENCOUNTER — OFFICE (OUTPATIENT)
Dept: URBAN - METROPOLITAN AREA CLINIC 104 | Facility: CLINIC | Age: 73
Setting detail: OPHTHALMOLOGY
End: 2023-10-12
Payer: MEDICARE

## 2023-10-12 DIAGNOSIS — Z96.1: ICD-10-CM

## 2023-10-12 PROCEDURE — 99024 POSTOP FOLLOW-UP VISIT: CPT | Performed by: OPTOMETRIST

## 2023-10-12 ASSESSMENT — LID EXAM ASSESSMENTS
OS_COMMENTS: 2+ LATERAL FAT PROLAPS
OS_CENTRAL_FAT_PROLAPSE: 2+
OD_COMMENTS: 2+ MEDIAL
OD_COMMENTS: 2+ LATERAL
OD_LATERAL_FAT_PROLAPSE: 2+
OD_MRD1: 2.5 MM
OD_CENTRAL_FAT_PROLAPSE: 2+
OS_FRONTALIS_USE: 3+
OD_MEDIAL_FAT_PROLAPSE: 2+
OS_LEVATOR_FUNCTION: 18 MM
OS_COMMENTS: 2+ LATERAL
OD_COMMENTS: 2+ LATERAL FAT PROLAPS
OS_COMMENTS: 2+ MEDIAL FAT PROLAPSE
OS_MRD1: 3.5 MM
OD_LEVATOR_FUNCTION: 18 MM
OS_COMMENTS: 2+ MEDIAL
OS_LATERAL_FAT_PROLAPSE: 2+

## 2023-10-12 ASSESSMENT — KERATOMETRY
OD_K1POWER_DIOPTERS: 43.27
OD_AXISANGLE_DEGREES: 017
OD_K2POWER_DIOPTERS: 44.88
OS_K1POWER_DIOPTERS: 43.38
OS_K2POWER_DIOPTERS: 44.29
OS_AXISANGLE_DEGREES: 162

## 2023-10-12 ASSESSMENT — SUPERFICIAL PUNCTATE KERATITIS (SPK)
OD_SPK: 2+
OS_SPK: 2+

## 2023-10-12 ASSESSMENT — REFRACTION_AUTOREFRACTION
OD_AXIS: 085
OS_SPHERE: +1.00
OS_AXIS: 081
OD_CYLINDER: -1.25
OS_CYLINDER: -1.25
OD_SPHERE: +1.00

## 2023-10-12 ASSESSMENT — SPHEQUIV_DERIVED
OD_SPHEQUIV: 0.375
OS_SPHEQUIV: 0.375

## 2023-10-12 ASSESSMENT — TONOMETRY: OD_IOP_MMHG: 15

## 2023-10-12 ASSESSMENT — DRY EYES - PHYSICIAN NOTES
OS_GENERALCOMMENTS: L
OD_GENERALCOMMENTS: L

## 2023-10-12 ASSESSMENT — VISUAL ACUITY
OS_BCVA: 20/40
OD_BCVA: 20/40

## 2023-10-12 ASSESSMENT — AXIALLENGTH_DERIVED
OS_AL: 23.3258
OD_AL: 23.2399

## 2023-10-12 ASSESSMENT — CORNEAL EDEMA CLINICAL DESCRIPTION
OD_CORNEALEDEMA: T
OS_CORNEALEDEMA: T

## 2023-10-12 ASSESSMENT — LID POSITION - PTOSIS
OS_PTOSIS: LUL
OD_PTOSIS: RUL

## 2023-10-12 ASSESSMENT — CONFRONTATIONAL VISUAL FIELD TEST (CVF)
OD_FINDINGS: FULL
OS_FINDINGS: FULL

## 2023-10-12 ASSESSMENT — LID POSITION - COMMENTS: OD_COMMENTS: MILD

## 2023-11-06 ENCOUNTER — OFFICE (OUTPATIENT)
Dept: URBAN - METROPOLITAN AREA CLINIC 104 | Facility: CLINIC | Age: 73
Setting detail: OPHTHALMOLOGY
End: 2023-11-06
Payer: MEDICARE

## 2023-11-06 DIAGNOSIS — Z96.1: ICD-10-CM

## 2023-11-06 DIAGNOSIS — H26.493: ICD-10-CM

## 2023-11-06 PROCEDURE — 99024 POSTOP FOLLOW-UP VISIT: CPT | Performed by: OPHTHALMOLOGY

## 2023-11-06 ASSESSMENT — DRY EYES - PHYSICIAN NOTES
OD_GENERALCOMMENTS: L
OS_GENERALCOMMENTS: L

## 2023-11-06 ASSESSMENT — REFRACTION_MANIFEST
OD_VA2: 20/20(J1+)
OD_SPHERE: -0.75
OS_CYLINDER: -0.50
OS_VA1: 20/20
OD_AXIS: 080
OS_AXIS: 075
OD_CYLINDER: -0.25
OS_SPHERE: PLANO
OS_ADD: +2.75
OD_ADD: +2.75
OD_VA1: 20/20
OU_VA: 20/20
OS_VA2: 20/20(J1+)

## 2023-11-06 ASSESSMENT — LID POSITION - PTOSIS
OS_PTOSIS: LUL
OD_PTOSIS: RUL

## 2023-11-06 ASSESSMENT — LID EXAM ASSESSMENTS
OS_MRD1: 3.5 MM
OD_COMMENTS: 2+ LATERAL
OD_CENTRAL_FAT_PROLAPSE: 2+
OS_COMMENTS: 2+ LATERAL FAT PROLAPS
OD_COMMENTS: 2+ MEDIAL
OD_LEVATOR_FUNCTION: 18 MM
OS_COMMENTS: 2+ MEDIAL FAT PROLAPSE
OD_LATERAL_FAT_PROLAPSE: 2+
OS_CENTRAL_FAT_PROLAPSE: 2+
OS_COMMENTS: 2+ LATERAL
OD_MEDIAL_FAT_PROLAPSE: 2+
OS_LATERAL_FAT_PROLAPSE: 2+
OS_LEVATOR_FUNCTION: 18 MM
OS_FRONTALIS_USE: 3+
OD_MRD1: 2.5 MM
OD_COMMENTS: 2+ LATERAL FAT PROLAPS
OS_COMMENTS: 2+ MEDIAL

## 2023-11-06 ASSESSMENT — CONFRONTATIONAL VISUAL FIELD TEST (CVF)
OS_FINDINGS: FULL
OD_FINDINGS: FULL

## 2023-11-06 ASSESSMENT — SPHEQUIV_DERIVED
OD_SPHEQUIV: -0.875
OS_SPHEQUIV: 1.125
OD_SPHEQUIV: 0.125

## 2023-11-06 ASSESSMENT — REFRACTION_AUTOREFRACTION
OS_CYLINDER: -2.25
OS_SPHERE: +2.25
OD_AXIS: 080
OS_AXIS: 074
OD_CYLINDER: -1.25
OD_SPHERE: +0.75

## 2023-11-06 ASSESSMENT — SUPERFICIAL PUNCTATE KERATITIS (SPK)
OD_SPK: 2+
OS_SPK: 2+

## 2023-11-06 ASSESSMENT — CORNEAL EDEMA CLINICAL DESCRIPTION
OD_CORNEALEDEMA: ABSENT
OS_CORNEALEDEMA: ABSENT

## 2023-11-06 ASSESSMENT — LID POSITION - COMMENTS: OD_COMMENTS: MILD

## 2024-01-02 ENCOUNTER — RX ONLY (RX ONLY)
Age: 74
End: 2024-01-02

## 2024-01-02 ENCOUNTER — OFFICE (OUTPATIENT)
Dept: URBAN - METROPOLITAN AREA CLINIC 70 | Facility: CLINIC | Age: 74
Setting detail: OPHTHALMOLOGY
End: 2024-01-02
Payer: MEDICARE

## 2024-01-02 DIAGNOSIS — Z96.1: ICD-10-CM

## 2024-01-02 DIAGNOSIS — H16.223: ICD-10-CM

## 2024-01-02 DIAGNOSIS — H40.033: ICD-10-CM

## 2024-01-02 DIAGNOSIS — H26.491: ICD-10-CM

## 2024-01-02 DIAGNOSIS — H40.013: ICD-10-CM

## 2024-01-02 PROCEDURE — 99024 POSTOP FOLLOW-UP VISIT: CPT | Performed by: OPHTHALMOLOGY

## 2024-01-02 PROCEDURE — 66821 AFTER CATARACT LASER SURGERY: CPT | Mod: 55,RT | Performed by: OPHTHALMOLOGY

## 2024-01-02 ASSESSMENT — REFRACTION_AUTOREFRACTION
OS_AXIS: 056
OD_SPHERE: +0.50
OS_CYLINDER: -1.75
OD_CYLINDER: -1.50
OS_SPHERE: +1.50
OD_AXIS: 086

## 2024-01-02 ASSESSMENT — LID EXAM ASSESSMENTS
OS_MRD1: 3.5 MM
OD_LATERAL_FAT_PROLAPSE: 2+
OD_COMMENTS: 2+ LATERAL FAT PROLAPS
OS_COMMENTS: 2+ LATERAL
OS_LEVATOR_FUNCTION: 18 MM
OD_LEVATOR_FUNCTION: 18 MM
OD_MRD1: 2.5 MM
OS_CENTRAL_FAT_PROLAPSE: 2+
OD_CENTRAL_FAT_PROLAPSE: 2+
OS_COMMENTS: 2+ LATERAL FAT PROLAPS
OS_COMMENTS: 2+ MEDIAL
OS_FRONTALIS_USE: 3+
OD_MEDIAL_FAT_PROLAPSE: 2+
OD_COMMENTS: 2+ MEDIAL
OS_LATERAL_FAT_PROLAPSE: 2+
OS_COMMENTS: 2+ MEDIAL FAT PROLAPSE
OD_COMMENTS: 2+ LATERAL

## 2024-01-02 ASSESSMENT — REFRACTION_MANIFEST
OS_CYLINDER: -0.50
OS_VA1: 20/20
OD_SPHERE: -0.75
OS_VA2: 20/20(J1+)
OD_VA2: 20/20(J1+)
OD_AXIS: 080
OD_ADD: +2.75
OS_ADD: +2.75
OU_VA: 20/20
OS_AXIS: 075
OS_SPHERE: PLANO
OD_VA1: 20/20
OD_CYLINDER: -0.25

## 2024-01-02 ASSESSMENT — SUPERFICIAL PUNCTATE KERATITIS (SPK)
OS_SPK: 2+
OD_SPK: 2+

## 2024-01-02 ASSESSMENT — DRY EYES - PHYSICIAN NOTES
OD_GENERALCOMMENTS: L
OS_GENERALCOMMENTS: L

## 2024-01-02 ASSESSMENT — CORNEAL EDEMA CLINICAL DESCRIPTION
OS_CORNEALEDEMA: ABSENT
OD_CORNEALEDEMA: ABSENT

## 2024-01-02 ASSESSMENT — CONFRONTATIONAL VISUAL FIELD TEST (CVF)
OD_FINDINGS: FULL
OS_FINDINGS: FULL

## 2024-01-02 ASSESSMENT — SPHEQUIV_DERIVED
OD_SPHEQUIV: -0.875
OS_SPHEQUIV: 0.625
OD_SPHEQUIV: -0.25

## 2024-01-02 ASSESSMENT — LID POSITION - PTOSIS
OS_PTOSIS: LUL
OD_PTOSIS: RUL

## 2024-01-02 ASSESSMENT — LID POSITION - COMMENTS: OD_COMMENTS: MILD

## 2024-01-16 ENCOUNTER — OFFICE (OUTPATIENT)
Dept: URBAN - METROPOLITAN AREA CLINIC 70 | Facility: CLINIC | Age: 74
Setting detail: OPHTHALMOLOGY
End: 2024-01-16
Payer: MEDICARE

## 2024-01-16 ENCOUNTER — RX ONLY (RX ONLY)
Age: 74
End: 2024-01-16

## 2024-01-16 DIAGNOSIS — H26.492: ICD-10-CM

## 2024-01-16 PROCEDURE — 66821 AFTER CATARACT LASER SURGERY: CPT | Mod: 79,LT | Performed by: OPHTHALMOLOGY

## 2024-01-16 ASSESSMENT — LID EXAM ASSESSMENTS
OD_LATERAL_FAT_PROLAPSE: 2+
OD_MRD1: 2.5 MM
OD_LEVATOR_FUNCTION: 18 MM
OS_COMMENTS: 2+ MEDIAL FAT PROLAPSE
OS_CENTRAL_FAT_PROLAPSE: 2+
OD_COMMENTS: 2+ LATERAL FAT PROLAPS
OD_COMMENTS: 2+ LATERAL
OD_CENTRAL_FAT_PROLAPSE: 2+
OS_COMMENTS: 2+ LATERAL FAT PROLAPS
OS_COMMENTS: 2+ MEDIAL
OS_MRD1: 3.5 MM
OD_COMMENTS: 2+ MEDIAL
OS_FRONTALIS_USE: 3+
OS_LATERAL_FAT_PROLAPSE: 2+
OS_COMMENTS: 2+ LATERAL
OS_LEVATOR_FUNCTION: 18 MM
OD_MEDIAL_FAT_PROLAPSE: 2+

## 2024-01-16 ASSESSMENT — LID POSITION - COMMENTS: OD_COMMENTS: MILD

## 2024-01-16 ASSESSMENT — LID POSITION - PTOSIS
OS_PTOSIS: LUL
OD_PTOSIS: RUL

## 2024-01-16 ASSESSMENT — REFRACTION_AUTOREFRACTION
OS_SPHERE: +1.75
OS_AXIS: 067
OD_SPHERE: +1.25
OS_CYLINDER: -2.25
OD_CYLINDER: -2.00
OD_AXIS: 093

## 2024-01-16 ASSESSMENT — SUPERFICIAL PUNCTATE KERATITIS (SPK)
OS_SPK: 2+
OD_SPK: 2+

## 2024-01-16 ASSESSMENT — CORNEAL EDEMA CLINICAL DESCRIPTION
OS_CORNEALEDEMA: ABSENT
OD_CORNEALEDEMA: ABSENT

## 2024-01-16 ASSESSMENT — REFRACTION_MANIFEST
OS_VA2: 20/20(J1+)
OU_VA: 20/20
OS_VA1: 20/20
OD_AXIS: 080
OD_SPHERE: -0.75
OD_VA1: 20/20
OD_VA2: 20/20(J1+)
OS_CYLINDER: -0.50
OS_ADD: +2.75
OD_ADD: +2.75
OS_SPHERE: PLANO
OS_AXIS: 075
OD_CYLINDER: -0.25

## 2024-01-16 ASSESSMENT — DRY EYES - PHYSICIAN NOTES
OS_GENERALCOMMENTS: L
OD_GENERALCOMMENTS: L

## 2024-01-16 ASSESSMENT — SPHEQUIV_DERIVED
OS_SPHEQUIV: 0.625
OD_SPHEQUIV: 0.25
OD_SPHEQUIV: -0.875

## 2024-01-16 ASSESSMENT — CONFRONTATIONAL VISUAL FIELD TEST (CVF)
OS_FINDINGS: FULL
OD_FINDINGS: FULL

## 2024-02-06 ENCOUNTER — OFFICE (OUTPATIENT)
Dept: URBAN - METROPOLITAN AREA CLINIC 70 | Facility: CLINIC | Age: 74
Setting detail: OPHTHALMOLOGY
End: 2024-02-06
Payer: MEDICARE

## 2024-02-06 ENCOUNTER — RX ONLY (RX ONLY)
Age: 74
End: 2024-02-06

## 2024-02-06 DIAGNOSIS — H40.013: ICD-10-CM

## 2024-02-06 DIAGNOSIS — Z96.1: ICD-10-CM

## 2024-02-06 DIAGNOSIS — H02.403: ICD-10-CM

## 2024-02-06 DIAGNOSIS — H16.223: ICD-10-CM

## 2024-02-06 DIAGNOSIS — H26.493: ICD-10-CM

## 2024-02-06 DIAGNOSIS — H40.033: ICD-10-CM

## 2024-02-06 PROCEDURE — 99024 POSTOP FOLLOW-UP VISIT: CPT | Performed by: OPHTHALMOLOGY

## 2024-02-06 ASSESSMENT — REFRACTION_MANIFEST
OS_ADD: +2.75
OS_VA1: 20/NI
OD_VA1: 20/20
OD_AXIS: 090
OD_VA1: 20/NI
OD_VA2: 20/20(J1+)
OS_VA2: 20/20(J1+)
OS_VA1: 20/20
OD_SPHERE: PLANO
OD_CYLINDER: -0.75
OD_SPHERE: -0.75
OS_CYLINDER: SPH
OS_CYLINDER: -0.50
OS_SPHERE: +0.75
OD_AXIS: 080
OD_CYLINDER: -0.25
OS_AXIS: 075
OD_ADD: +2.75
OS_ADD: +2.75
OS_SPHERE: PLANO
OD_ADD: +2.75
OU_VA: 20/20

## 2024-02-06 ASSESSMENT — REFRACTION_AUTOREFRACTION
OD_CYLINDER: -1.25
OD_AXIS: 090
OS_SPHERE: +1.00
OD_SPHERE: +0.50
OS_CYLINDER: -1.00
OS_AXIS: 041

## 2024-02-06 ASSESSMENT — LID POSITION - COMMENTS: OD_COMMENTS: MILD

## 2024-02-06 ASSESSMENT — LID EXAM ASSESSMENTS
OD_MEDIAL_FAT_PROLAPSE: 2+
OS_LATERAL_FAT_PROLAPSE: 2+
OD_COMMENTS: 2+ LATERAL FAT PROLAPS
OS_COMMENTS: 2+ MEDIAL FAT PROLAPSE
OS_COMMENTS: 2+ MEDIAL
OS_COMMENTS: 2+ LATERAL FAT PROLAPS
OD_LEVATOR_FUNCTION: 18 MM
OD_LATERAL_FAT_PROLAPSE: 2+
OD_MRD1: 2.5 MM
OD_CENTRAL_FAT_PROLAPSE: 2+
OD_COMMENTS: 2+ MEDIAL
OS_FRONTALIS_USE: 3+
OS_LEVATOR_FUNCTION: 18 MM
OS_COMMENTS: 2+ LATERAL
OD_COMMENTS: 2+ LATERAL
OS_MRD1: 3.5 MM
OS_CENTRAL_FAT_PROLAPSE: 2+

## 2024-02-06 ASSESSMENT — LID POSITION - PTOSIS
OD_PTOSIS: RUL
OS_PTOSIS: LUL

## 2024-02-06 ASSESSMENT — DRY EYES - PHYSICIAN NOTES
OS_GENERALCOMMENTS: L
OD_GENERALCOMMENTS: L

## 2024-02-06 ASSESSMENT — SUPERFICIAL PUNCTATE KERATITIS (SPK)
OS_SPK: 2+
OD_SPK: 2+

## 2024-02-06 ASSESSMENT — SPHEQUIV_DERIVED
OD_SPHEQUIV: -0.125
OD_SPHEQUIV: -0.875
OS_SPHEQUIV: 0.5

## 2024-02-06 ASSESSMENT — CONFRONTATIONAL VISUAL FIELD TEST (CVF)
OS_FINDINGS: FULL
OD_FINDINGS: FULL

## 2024-03-14 ENCOUNTER — APPOINTMENT (OUTPATIENT)
Dept: NEUROLOGY | Facility: CLINIC | Age: 74
End: 2024-03-14
Payer: MEDICARE

## 2024-03-14 VITALS
SYSTOLIC BLOOD PRESSURE: 119 MMHG | TEMPERATURE: 97.8 F | DIASTOLIC BLOOD PRESSURE: 77 MMHG | BODY MASS INDEX: 31.28 KG/M2 | HEIGHT: 62 IN | WEIGHT: 170 LBS | HEART RATE: 87 BPM

## 2024-03-14 DIAGNOSIS — G31.84 MILD COGNITIVE IMPAIRMENT, SO STATED: ICD-10-CM

## 2024-03-14 DIAGNOSIS — R06.83 SNORING: ICD-10-CM

## 2024-03-14 DIAGNOSIS — E53.8 DEFICIENCY OF OTHER SPECIFIED B GROUP VITAMINS: ICD-10-CM

## 2024-03-14 PROCEDURE — 99204 OFFICE O/P NEW MOD 45 MIN: CPT

## 2024-03-14 RX ORDER — NORTRIPTYLINE HYDROCHLORIDE 10 MG/1
10 CAPSULE ORAL DAILY
Refills: 0 | Status: ACTIVE | COMMUNITY

## 2024-03-14 RX ORDER — TOCILIZUMAB 20 MG/ML
INJECTION, SOLUTION, CONCENTRATE INTRAVENOUS
Refills: 0 | Status: ACTIVE | COMMUNITY

## 2024-03-14 RX ORDER — FOLIC ACID 1 MG/1
1 TABLET ORAL DAILY
Refills: 0 | Status: ACTIVE | COMMUNITY

## 2024-03-14 RX ORDER — ROSUVASTATIN CALCIUM 40 MG/1
40 TABLET, FILM COATED ORAL DAILY
Refills: 0 | Status: ACTIVE | COMMUNITY

## 2024-03-14 RX ORDER — LEVOTHYROXINE SODIUM 125 UG/1
125 TABLET ORAL DAILY
Refills: 0 | Status: ACTIVE | COMMUNITY

## 2024-03-14 RX ORDER — LAMOTRIGINE 100 MG/1
100 TABLET ORAL DAILY
Refills: 0 | Status: ACTIVE | COMMUNITY

## 2024-03-14 RX ORDER — ERGOCALCIFEROL 1.25 MG/1
1.25 MG CAPSULE, LIQUID FILLED ORAL
Refills: 0 | Status: ACTIVE | COMMUNITY

## 2024-03-14 RX ORDER — RAMIPRIL 10 MG/1
10 CAPSULE ORAL DAILY
Refills: 0 | Status: ACTIVE | COMMUNITY

## 2024-03-14 RX ORDER — ESCITALOPRAM OXALATE 10 MG/1
10 TABLET, FILM COATED ORAL DAILY
Refills: 0 | Status: ACTIVE | COMMUNITY

## 2024-03-14 NOTE — PHYSICAL EXAM
[___ / 5] : Visuospatial / Executive: [unfilled] / 5 [0 / 0] : Memory: 0 / 0 [___ / 3] : Attention (Serial 7 subtraction): [unfilled] / 3 [___ / 1] : Fluency: [unfilled] / 1 [___ / 2] : Abstraction: [unfilled] / 2 [___ / 6] : Orientation: [unfilled] / 6 [___ / 5] : Delayed Recall: [unfilled] / 5 [FreeTextEntry1] : Examination: Constitutional: normal, no apparent distress Eyes: normal conjunctiva b/l, no ptosis, visual fields full Respiratory: no respiratory distress, normal effort, normal auscultation Cardiovascular: normal rate, rhythm, no murmurs Neck: supple, no masses Vascular: carotids normal Skin: normal color, no rashes Psych: normal mood, affect  Neurological: Memory: normal memory, oriented to person, place, time Language intact/no aphasia Cranial Nerves: II-XII intact, Pupils equally round and reactive to light, ocular muscles/movements intact, no ptosis, no facial weakness, tongue protrudes normally in the midline,  Motor: normal tone, no pronator drift, full strength in all four extremities in the proximal and distal muscle groups. decreased range of motion in left arm,  Coordination: Fine motor movements intact, rapid alternating movements intact, finger to nose intact bilaterally Sensory: intact to light touch, vibration, joint position sense, DTRs: symmetric, 2+ in b/l triceps, 2+ in b/l biceps, 2+ in b/l brachioradialis, 2+ in bilateral patellars, 1+ in bilateral Achilles, Babinskis negative bilaterally Gait: narrow based, steady, short steps but not shuffling  [Over the Past 2 Weeks, Have You Felt Down, Depressed, or Hopeless?] : 1.) Over the past 2 weeks, have you felt down, depressed, or hopeless? No [Over the Past 2 Weeks, Have You Felt Little Interest or Pleasure Doing Things?] : 2.) Over the past 2 weeks, have you felt little interest or pleasure doing things? No [MocaTotal] : 25

## 2024-03-14 NOTE — DISCUSSION/SUMMARY
[FreeTextEntry1] : Ms. Henry is a 73 year old woman who reports word finding difficulty and short term memory problems over the last two years.  She scored 25/30 on the Jose Ramon Cognitive Assessment which is slightly abnormal. Points were lost on visuospatial/executive and delayed recall.  Mild cognitive impairment: -She has a slightly abnormal score on the MoCA -At this time I do not think that she needs medication such as donepezil -Increase sleep duration -Will check vitamin B12 level -Will check a home sleep study to look for possible obstructive sleep apnea -She would like to have a repeat MRI brain.  This has been ordered as well -We discussed the importance of participating and physically and mentally stimulating activities -Mediterranean diet  Lacunar infarcts -MRI in 2022 showed lacunar infarcts.  She was not aware of this -I recommend that she take aspirin 81 mg/day -She reports that she has had carotid ultrasounds with her cardiologist  Follow-up after above testing, sooner if needed

## 2024-03-14 NOTE — HISTORY OF PRESENT ILLNESS
[FreeTextEntry1] : Ms. Henry is here today for neurology evaluation. She reports that she has had difficulty finding words for ~ 2 years (MRIs from Dr. Diallo's office date back to 2016). She believes that it is getting worse. She also notes some difficulty with concentrating. For instance, she clicked the wrong address on GPS this morning. She lives alone and is independent in her ADLs. She does not get lost in familiar places. She manages her finances without difficulty. She denies have difficulty managing her medication. She does sometimes get appointments confused.  She misplaces things in her house and cannot remember why she entered a room. She says that she sleeps for about five hours per night because of her busy lifestyle. She is aware of snoring. She had a sleep study about four years ago and was told that she did not have sleep apnea.  She has been taking escitalopram, lamotrigine and nortriptyline for depression for 30 years. She denies depression at this time.  She denies difficulty walking or urinary incontinence.   She has seen Dr. Diallo several times. An MRI was ordered.   She gets Actemra infusions for rheumatoid arthritis.

## 2024-03-14 NOTE — CONSULT LETTER
[Dear  ___] : Dear  [unfilled], [Consult Letter:] : I had the pleasure of evaluating your patient, [unfilled]. [Please see my note below.] : Please see my note below. [Consult Closing:] : Thank you very much for allowing me to participate in the care of this patient.  If you have any questions, please do not hesitate to contact me. [FreeTextEntry2] : Dayday Gaitan [FreeTextEntry3] : Sincerely,   Lluvia Art MD Diplomate, American Academy of Psychiatry and Neurology Board Certified in the Subspecialty of Clinical Neurophysiology Board Certified in the Subspecialty of Sleep Medicine Board Certified in the Subspecialty of Epilepsy

## 2024-03-14 NOTE — DATA REVIEWED
[de-identified] : MRI brain September 2022: 1.  Minimal chronic ischemic change, supratentorial white matter with small old lacunar infarcts in both basal ganglia and centrum semiovale.  This pattern of microvascular disease has slightly increased since the prior study dated 11/2/2017. 2. No acute infarct, mass, hemorrhage or hydrocephalus

## 2024-03-19 ENCOUNTER — APPOINTMENT (OUTPATIENT)
Dept: MRI IMAGING | Facility: CLINIC | Age: 74
End: 2024-03-19
Payer: MEDICARE

## 2024-03-19 PROCEDURE — 70551 MRI BRAIN STEM W/O DYE: CPT

## 2024-03-25 ENCOUNTER — APPOINTMENT (OUTPATIENT)
Dept: NEUROLOGY | Facility: CLINIC | Age: 74
End: 2024-03-25
Payer: MEDICARE

## 2024-03-25 PROCEDURE — 99213 OFFICE O/P EST LOW 20 MIN: CPT

## 2024-03-27 ENCOUNTER — NON-APPOINTMENT (OUTPATIENT)
Age: 74
End: 2024-03-27

## 2024-04-11 ENCOUNTER — NON-APPOINTMENT (OUTPATIENT)
Age: 74
End: 2024-04-11

## 2024-05-01 ENCOUNTER — OFFICE (OUTPATIENT)
Dept: URBAN - METROPOLITAN AREA CLINIC 70 | Facility: CLINIC | Age: 74
Setting detail: OPHTHALMOLOGY
End: 2024-05-01
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
## 2024-05-01 PROCEDURE — NO SHOW FE NO SHOW FEE: Performed by: OPHTHALMOLOGY

## 2024-05-10 ENCOUNTER — NON-APPOINTMENT (OUTPATIENT)
Age: 74
End: 2024-05-10

## 2024-05-29 ENCOUNTER — APPOINTMENT (OUTPATIENT)
Dept: PULMONOLOGY | Facility: CLINIC | Age: 74
End: 2024-05-29
Payer: MEDICARE

## 2024-05-29 VITALS
WEIGHT: 167 LBS | OXYGEN SATURATION: 97 % | BODY MASS INDEX: 30.73 KG/M2 | DIASTOLIC BLOOD PRESSURE: 70 MMHG | HEIGHT: 62 IN | SYSTOLIC BLOOD PRESSURE: 133 MMHG | HEART RATE: 79 BPM

## 2024-05-29 DIAGNOSIS — M06.9 RHEUMATOID ARTHRITIS, UNSPECIFIED: ICD-10-CM

## 2024-05-29 DIAGNOSIS — M19.90 UNSPECIFIED OSTEOARTHRITIS, UNSPECIFIED SITE: ICD-10-CM

## 2024-05-29 DIAGNOSIS — G47.33 OBSTRUCTIVE SLEEP APNEA (ADULT) (PEDIATRIC): ICD-10-CM

## 2024-05-29 PROCEDURE — 99204 OFFICE O/P NEW MOD 45 MIN: CPT

## 2024-05-29 PROCEDURE — 71046 X-RAY EXAM CHEST 2 VIEWS: CPT

## 2024-05-29 NOTE — PROCEDURE
[FreeTextEntry1] : CT angio chest dated April 5, 2024 No enlarged lymph nodes are seen within the chest or axillae. Calcified mediastinal and hilar lymph nodes are noted consistent with prior granulomatous disease.  Chest x-ray clear no adenopathy or calcifications

## 2024-05-29 NOTE — HISTORY OF PRESENT ILLNESS
[Never] : never [Awakes with Dry Mouth] : awakes with dry mouth [Snoring] : snoring [TextBox_4] : Patient here for sleep apnea evaluation as well as to evaluate abnormal chest CT finding History of snoring daytime sleepiness and nonrestorative sleep.  Underwent home sleep study that showed mild sleep apnea (report not available).  Did have MRI showing microvascular cerebral disease. History of longstanding rheumatoid arthritis currently on Actemra.  Gets annual tuberculosis screening reportedly negative Had a recent cardiac screening test showing small pulmonary granulomas as well as calcified mediastinal and hilar lymph nodes.  No tuberculosis or sarcoidosis by history.  [place].  Does not recall prolonged respiratory illness in the past [Awakes Unrefreshed] : does not awaken unrefreshed [Awakes with Headache] : does not awaken with headache [Tired while Driving] : not tired while driving [Witnessed Apneas] : no witnessed apneas

## 2024-06-28 ENCOUNTER — APPOINTMENT (OUTPATIENT)
Dept: PULMONOLOGY | Facility: CLINIC | Age: 74
End: 2024-06-28
Payer: MEDICARE

## 2024-06-28 VITALS — HEIGHT: 62 IN | WEIGHT: 170 LBS | BODY MASS INDEX: 31.28 KG/M2

## 2024-06-28 PROCEDURE — 94010 BREATHING CAPACITY TEST: CPT

## 2024-06-28 PROCEDURE — 94726 PLETHYSMOGRAPHY LUNG VOLUMES: CPT

## 2024-06-28 PROCEDURE — 94729 DIFFUSING CAPACITY: CPT

## 2024-07-01 ENCOUNTER — TRANSCRIPTION ENCOUNTER (OUTPATIENT)
Age: 74
End: 2024-07-01

## 2024-07-19 ENCOUNTER — OFFICE (OUTPATIENT)
Dept: URBAN - METROPOLITAN AREA CLINIC 104 | Facility: CLINIC | Age: 74
Setting detail: OPHTHALMOLOGY
End: 2024-07-19
Payer: MEDICARE

## 2024-07-19 DIAGNOSIS — H16.223: ICD-10-CM

## 2024-07-19 DIAGNOSIS — Z96.1: ICD-10-CM

## 2024-07-19 DIAGNOSIS — H02.403: ICD-10-CM

## 2024-07-19 DIAGNOSIS — H26.493: ICD-10-CM

## 2024-07-19 DIAGNOSIS — H40.013: ICD-10-CM

## 2024-07-19 DIAGNOSIS — H40.033: ICD-10-CM

## 2024-07-19 PROCEDURE — 99213 OFFICE O/P EST LOW 20 MIN: CPT | Performed by: OPHTHALMOLOGY

## 2024-07-19 PROCEDURE — 92250 FUNDUS PHOTOGRAPHY W/I&R: CPT | Performed by: OPHTHALMOLOGY

## 2024-07-19 ASSESSMENT — LID EXAM ASSESSMENTS
OS_MRD1: 3.5 MM
OS_CENTRAL_FAT_PROLAPSE: 2+
OD_MRD1: 2.5 MM
OS_LATERAL_FAT_PROLAPSE: 2+
OS_COMMENTS: 2+ MEDIAL FAT PROLAPSE
OS_COMMENTS: 2+ LATERAL FAT PROLAPS
OD_COMMENTS: 2+ MEDIAL
OS_COMMENTS: 2+ MEDIAL
OD_COMMENTS: 2+ LATERAL
OS_LEVATOR_FUNCTION: 18 MM
OD_MEDIAL_FAT_PROLAPSE: 2+
OD_COMMENTS: 2+ LATERAL FAT PROLAPS
OD_LEVATOR_FUNCTION: 18 MM
OS_COMMENTS: 2+ LATERAL
OS_FRONTALIS_USE: 3+
OD_LATERAL_FAT_PROLAPSE: 2+
OD_CENTRAL_FAT_PROLAPSE: 2+

## 2024-07-19 ASSESSMENT — LID POSITION - COMMENTS: OD_COMMENTS: MILD

## 2024-07-19 ASSESSMENT — LID POSITION - PTOSIS
OD_PTOSIS: RUL
OS_PTOSIS: LUL

## 2024-07-19 ASSESSMENT — CONFRONTATIONAL VISUAL FIELD TEST (CVF)
OD_FINDINGS: FULL
OS_FINDINGS: FULL

## 2024-08-02 ENCOUNTER — APPOINTMENT (OUTPATIENT)
Dept: PULMONOLOGY | Facility: CLINIC | Age: 74
End: 2024-08-02

## 2024-08-02 PROCEDURE — ZZZZZ: CPT

## 2024-10-15 ENCOUNTER — APPOINTMENT (OUTPATIENT)
Dept: GASTROENTEROLOGY | Facility: CLINIC | Age: 74
End: 2024-10-15
Payer: MEDICARE

## 2024-10-15 VITALS
WEIGHT: 172 LBS | DIASTOLIC BLOOD PRESSURE: 60 MMHG | HEIGHT: 61 IN | SYSTOLIC BLOOD PRESSURE: 119 MMHG | HEART RATE: 94 BPM | OXYGEN SATURATION: 96 % | BODY MASS INDEX: 32.47 KG/M2

## 2024-10-15 DIAGNOSIS — D50.9 IRON DEFICIENCY ANEMIA, UNSPECIFIED: ICD-10-CM

## 2024-10-15 DIAGNOSIS — G45.9 TRANSIENT CEREBRAL ISCHEMIC ATTACK, UNSPECIFIED: ICD-10-CM

## 2024-10-15 DIAGNOSIS — Z80.0 FAMILY HISTORY OF MALIGNANT NEOPLASM OF DIGESTIVE ORGANS: ICD-10-CM

## 2024-10-15 DIAGNOSIS — Z87.39 PERSONAL HISTORY OF OTHER DISEASES OF THE MUSCULOSKELETAL SYSTEM AND CONNECTIVE TISSUE: ICD-10-CM

## 2024-10-15 PROCEDURE — 99203 OFFICE O/P NEW LOW 30 MIN: CPT

## 2024-10-17 NOTE — ASU PATIENT PROFILE, ADULT - FALL HARM RISK - UNIVERSAL INTERVENTIONS
Bed in lowest position, wheels locked, appropriate side rails in place/Call bell, personal items and telephone in reach/Instruct patient to call for assistance before getting out of bed or chair/Non-slip footwear when patient is out of bed/[place] to call system/Physically safe environment - no spills, clutter or unnecessary equipment/Purposeful Proactive Rounding/Room/bathroom lighting operational, light cord in reach

## 2024-10-17 NOTE — ASU PATIENT PROFILE, ADULT - NS PRO LACT YNNA
Office Consent to Operation/Invasive Procedure    Name: Bee Overton   YOB: 1979   MRN: [id number]   AUTHORIZATION:  I authorize performance on the patient of the following surgical operation/invasive procedure under the direction, supervision and authority of [facility].  Description of operation(s): URODYNAMIC STUDY     NATURE OF TREATMENT:  The nature of my condition, the nature and purpose of the operation/procedure, possible alternative methods of treatment, risks involved, and the possibility of complications have been explained to me. I have had an opportunity to discuss this operation/procedure with the physician and other doctors concerned, and I have received answers to all questions I asked and do hereby assume all risks involved. No guarantee or assurance has been given to me by anyone as to the results that may be obtained.     ADDITIONAL PROCEDURES:  I consent to the performance of operations and procedures in addition to or different from those now contemplated, whether or not arising from presently unforeseen conditions, which the above-named doctor or his/her associate or assistants may consider necessary or advisable in the course of the operation.    OTHER QUALIFIED PRACTITIONERS:  I have been advised, and I agree, that the operation/procedure may be performed by a team of doctors including one or more attending, doctors, residents and medical students. I consent to these other qualified medical practitioners, who are not physicians, performing important parts of the operation/procedure or the administration of anesthetic agents.    ANESTHETIC AGENTS: I understand that anesthetic agents may have serious and even fatal complications. I consent to the administration of such anesthetics/sedatives/medications as may be considered necessary or advisable by the physician responsible for the service.      OBSERVATION:  For the purpose of advancing the educational  programs of the facility I consent to the admittance of qualified observers.    PHOTOGRAPHY: I consent to the taking and publication of photographs and other reproductions in the course of the operation or procedure for the purpose of advancing education provided that the identify of the patient is not revealed.    TISSUE USE:  I authorize the facility to preserve and use for scientific or teaching purposes or otherwise dispose of any dismembered tissue resulting from the procedure and treatment authorized above.    INDEPENDENT PHYSICIAN SERVICES: I have been informed and I acknowledge and fully understand that the Physician(s) providing services to me in this facility, such as my personal Physician(s),  Specialty Physician(s), Radiology, Pathology, consulting, and other allied health physicians, are independent contractors and are not employees or agents of this facility, unless otherwise specifically stated. My decision to seek medical care at the facility is not based upon any understanding, representation, advertisement, media campaign, inference, implication or reliance that the physicians who are or will be treating me are employees or agents of the facility.     DO NOT SIGN IF YOU HAVE ANY QUESTIONS  ________________________________________________________________________________________________________________  My signature below constitutes my acknowledgement and agreement that I have read and understand the above, was given an opportunity to discuss this form and ask questions, that all questions were answered to my satisfaction, and I am satisfied I understand the form's contents and significance.    Date: __________________ Time: _________________ Signed: ______________________________________________________        Patient/Legally Authorized Representative ([place] appropriate)     Date: __________________   Time: _________________   Signed: ______________________________________________________  Witness    Certificate of Interpretation:  I certify that I have read the foregoing to the signor hearof in the ____________________________________________________ language.     Date: ________________ Time: __________________ Signed: _________________________________________________________     Affirmation by Responsible Physician  I have informed the above named patient or Legally Authorized Representative of the medical condition requiring surgical treatment and/or the further diagnostic procedures referred to above. I have explained, consistent with accepted medical judgment, the nature and purposes of the treatment or procedures, the reasonable (1) possible alternatives (2) risks/complications and (3) consequences in the treatment or procedure consented to. I have also given the opportunity to ask questions and have answered any such questions.     Date:________________ Time: __________________ Signed: _________________________________________________________      no

## 2024-10-18 ENCOUNTER — APPOINTMENT (OUTPATIENT)
Dept: GASTROENTEROLOGY | Facility: HOSPITAL | Age: 74
End: 2024-10-18

## 2024-10-18 ENCOUNTER — OUTPATIENT (OUTPATIENT)
Dept: OUTPATIENT SERVICES | Facility: HOSPITAL | Age: 74
LOS: 1 days | Discharge: HOME | End: 2024-10-18
Payer: MEDICARE

## 2024-10-18 ENCOUNTER — RESULT REVIEW (OUTPATIENT)
Age: 74
End: 2024-10-18

## 2024-10-18 VITALS
RESPIRATION RATE: 20 BRPM | OXYGEN SATURATION: 100 % | DIASTOLIC BLOOD PRESSURE: 56 MMHG | HEART RATE: 70 BPM | SYSTOLIC BLOOD PRESSURE: 103 MMHG

## 2024-10-18 VITALS
TEMPERATURE: 97 F | SYSTOLIC BLOOD PRESSURE: 135 MMHG | OXYGEN SATURATION: 100 % | WEIGHT: 171.96 LBS | HEIGHT: 61 IN | DIASTOLIC BLOOD PRESSURE: 60 MMHG | RESPIRATION RATE: 18 BRPM | HEART RATE: 74 BPM

## 2024-10-18 DIAGNOSIS — Z85.828 PERSONAL HISTORY OF OTHER MALIGNANT NEOPLASM OF SKIN: Chronic | ICD-10-CM

## 2024-10-18 DIAGNOSIS — D50.9 IRON DEFICIENCY ANEMIA, UNSPECIFIED: ICD-10-CM

## 2024-10-18 DIAGNOSIS — S52.122A DISPLACED FRACTURE OF HEAD OF LEFT RADIUS, INITIAL ENCOUNTER FOR CLOSED FRACTURE: Chronic | ICD-10-CM

## 2024-10-18 DIAGNOSIS — Z98.890 OTHER SPECIFIED POSTPROCEDURAL STATES: Chronic | ICD-10-CM

## 2024-10-18 PROCEDURE — 88305 TISSUE EXAM BY PATHOLOGIST: CPT | Mod: 26

## 2024-10-18 PROCEDURE — 44360 SMALL BOWEL ENDOSCOPY: CPT | Mod: GC

## 2024-10-23 LAB — SURGICAL PATHOLOGY STUDY: SIGNIFICANT CHANGE UP

## 2024-10-30 RX ORDER — SODIUM SULFATE, POTASSIUM SULFATE AND MAGNESIUM SULFATE 1.6; 3.13; 17.5 G/177ML; G/177ML; G/177ML
17.5-3.13-1.6 SOLUTION ORAL
Qty: 1 | Refills: 0 | Status: ACTIVE | COMMUNITY
Start: 2024-10-30 | End: 1900-01-01

## 2024-11-14 NOTE — ASU PATIENT PROFILE, ADULT - IS PATIENT PREGNANT?
Follow-up with primary care doctor this week.  Follow-up with your COVID-19 results later today.  Return to the emergency department at any time for worsening or concerning symptoms.  
no

## 2024-11-14 NOTE — ASU PATIENT PROFILE, ADULT - INTERNATIONAL TRAVEL
SW/CM Discharge Plan  Informed patient is ready for discharge.  Patient to be picked up by ambulance. Patient/interested person has been counseled for post hospitalization care.  Patient agrees and understands goals and plan. Initial implementation of the patient’s discharge plan has been arranged, including any devices/equipment needed for discharge. Discharge plan communicated to MD, RN, SHARMILA, Receiving Facility/Agency, and patient .    Patient’s discharge destination is Rehabilitation/Skilled Care.    Selected Continued Care - Admitted Since 12/25/2023       Destination  Coordination complete.      Service Provider Selected Services Address Phone Fax    [facility] [address] [facility] [address] [phone]                       No

## 2024-11-14 NOTE — ASU PATIENT PROFILE, ADULT - NS PRO ABUSE SCREEN AFRAID ANYONE YN
no Detail Level: Detailed General Sunscreen Counseling: I recommended a broad spectrum sunscreen with a SPF of 30 or higher.  I explained that SPF 30 sunscreens block approximately 97 percent of the sun's harmful rays.  Sunscreens should be applied at least 15 minutes prior to expected sun exposure and then every 2 hours after that as long as sun exposure continues. If swimming or exercising sunscreen should be reapplied every 45 minutes to an hour after getting wet or sweating.  One ounce, or the equivalent of a shot glass full of sunscreen, is adequate to protect the skin not covered by a bathing suit. I also recommended a lip balm with a sunscreen as well. Sun protective clothing can be used in lieu of sunscreen but must be worn the entire time you are exposed to the sun's rays. I recommend checking the back of every sunscreen ingredient list and preferably using zinc oxide only. Products Recommended: Eucerin Sensitive Mineral Zinc Oxide SPF 50 or Avenue Baby Zinc Oxide SPF 50

## 2024-11-15 ENCOUNTER — APPOINTMENT (OUTPATIENT)
Dept: GASTROENTEROLOGY | Facility: HOSPITAL | Age: 74
End: 2024-11-15

## 2024-11-15 ENCOUNTER — OUTPATIENT (OUTPATIENT)
Dept: OUTPATIENT SERVICES | Facility: HOSPITAL | Age: 74
LOS: 1 days | Discharge: HOME | End: 2024-11-15
Payer: MEDICARE

## 2024-11-15 ENCOUNTER — RESULT REVIEW (OUTPATIENT)
Age: 74
End: 2024-11-15

## 2024-11-15 VITALS
OXYGEN SATURATION: 100 % | SYSTOLIC BLOOD PRESSURE: 130 MMHG | DIASTOLIC BLOOD PRESSURE: 52 MMHG | HEART RATE: 80 BPM | WEIGHT: 169.98 LBS | TEMPERATURE: 98 F | HEIGHT: 61 IN | RESPIRATION RATE: 12 BRPM

## 2024-11-15 VITALS
SYSTOLIC BLOOD PRESSURE: 122 MMHG | RESPIRATION RATE: 17 BRPM | DIASTOLIC BLOOD PRESSURE: 51 MMHG | OXYGEN SATURATION: 97 % | HEART RATE: 76 BPM

## 2024-11-15 DIAGNOSIS — Z85.828 PERSONAL HISTORY OF OTHER MALIGNANT NEOPLASM OF SKIN: Chronic | ICD-10-CM

## 2024-11-15 DIAGNOSIS — D50.9 IRON DEFICIENCY ANEMIA, UNSPECIFIED: ICD-10-CM

## 2024-11-15 DIAGNOSIS — S52.122A DISPLACED FRACTURE OF HEAD OF LEFT RADIUS, INITIAL ENCOUNTER FOR CLOSED FRACTURE: Chronic | ICD-10-CM

## 2024-11-15 DIAGNOSIS — Z98.890 OTHER SPECIFIED POSTPROCEDURAL STATES: Chronic | ICD-10-CM

## 2024-11-15 PROCEDURE — 88305 TISSUE EXAM BY PATHOLOGIST: CPT | Mod: 26

## 2024-11-15 PROCEDURE — 45382 COLONOSCOPY W/CONTROL BLEED: CPT | Mod: GC

## 2024-11-15 NOTE — ASU PREOP CHECKLIST - ANTIBIOTIC
n/a no focal seizures/no syncope/no tremors/no paresthesias/no generalized seizures/no transient paralysis/no weakness

## 2024-11-21 LAB — SURGICAL PATHOLOGY STUDY: SIGNIFICANT CHANGE UP

## 2024-12-04 ENCOUNTER — APPOINTMENT (OUTPATIENT)
Dept: NEUROLOGY | Facility: CLINIC | Age: 74
End: 2024-12-04
Payer: MEDICARE

## 2024-12-04 VITALS
TEMPERATURE: 98.2 F | WEIGHT: 170 LBS | BODY MASS INDEX: 32.1 KG/M2 | SYSTOLIC BLOOD PRESSURE: 138 MMHG | HEIGHT: 61 IN | DIASTOLIC BLOOD PRESSURE: 82 MMHG | HEART RATE: 89 BPM

## 2024-12-04 DIAGNOSIS — G31.84 MILD COGNITIVE IMPAIRMENT, SO STATED: ICD-10-CM

## 2024-12-04 DIAGNOSIS — G47.33 OBSTRUCTIVE SLEEP APNEA (ADULT) (PEDIATRIC): ICD-10-CM

## 2024-12-04 PROCEDURE — G2211 COMPLEX E/M VISIT ADD ON: CPT

## 2024-12-04 PROCEDURE — 99213 OFFICE O/P EST LOW 20 MIN: CPT

## 2024-12-16 ENCOUNTER — OFFICE (OUTPATIENT)
Dept: URBAN - METROPOLITAN AREA CLINIC 104 | Facility: CLINIC | Age: 74
Setting detail: OPHTHALMOLOGY
End: 2024-12-16
Payer: MEDICARE

## 2024-12-16 DIAGNOSIS — Z96.1: ICD-10-CM

## 2024-12-16 DIAGNOSIS — H16.223: ICD-10-CM

## 2024-12-16 DIAGNOSIS — H26.493: ICD-10-CM

## 2024-12-16 DIAGNOSIS — H02.403: ICD-10-CM

## 2024-12-16 DIAGNOSIS — H40.033: ICD-10-CM

## 2024-12-16 DIAGNOSIS — H16.222: ICD-10-CM

## 2024-12-16 DIAGNOSIS — H04.412: ICD-10-CM

## 2024-12-16 DIAGNOSIS — H40.013: ICD-10-CM

## 2024-12-16 DIAGNOSIS — H16.221: ICD-10-CM

## 2024-12-16 PROCEDURE — 99213 OFFICE O/P EST LOW 20 MIN: CPT | Performed by: OPHTHALMOLOGY

## 2024-12-16 PROCEDURE — 92133 CPTRZD OPH DX IMG PST SGM ON: CPT | Performed by: OPHTHALMOLOGY

## 2024-12-16 PROCEDURE — 83861 MICROFLUID ANALY TEARS: CPT | Mod: QW,LT | Performed by: OPHTHALMOLOGY

## 2024-12-16 PROCEDURE — 83861 MICROFLUID ANALY TEARS: CPT | Mod: QW,RT | Performed by: OPHTHALMOLOGY

## 2024-12-16 ASSESSMENT — REFRACTION_CURRENTRX
OS_OVR_VA: 20/
OS_SPHERE: +1.25
OD_SPHERE: -0.25
OS_CYLINDER: -0.75
OS_AXIS: 082
OD_CYLINDER: -0.50
OD_AXIS: 102
OD_OVR_VA: 20/

## 2024-12-16 ASSESSMENT — LID EXAM ASSESSMENTS
OD_MEDIAL_FAT_PROLAPSE: 2+
OS_FRONTALIS_USE: 3+
OD_LATERAL_FAT_PROLAPSE: 2+
OS_COMMENTS: 2+ MEDIAL FAT PROLAPSE
OS_COMMENTS: 2+ LATERAL
OD_COMMENTS: 2+ LATERAL
OD_LEVATOR_FUNCTION: 18 MM
OD_COMMENTS: 2+ LATERAL FAT PROLAPS
OS_COMMENTS: 2+ LATERAL FAT PROLAPS
OS_MRD1: 3.5 MM
OS_LATERAL_FAT_PROLAPSE: 2+
OS_COMMENTS: 2+ MEDIAL
OS_CENTRAL_FAT_PROLAPSE: 2+
OD_CENTRAL_FAT_PROLAPSE: 2+
OS_LEVATOR_FUNCTION: 18 MM
OD_MRD1: 2.5 MM
OD_COMMENTS: 2+ MEDIAL

## 2024-12-16 ASSESSMENT — SUPERFICIAL PUNCTATE KERATITIS (SPK)
OD_SPK: 2+
OS_SPK: 2+

## 2024-12-16 ASSESSMENT — REFRACTION_AUTOREFRACTION
OD_AXIS: 099
OS_AXIS: 071
OS_SPHERE: 0.00
OD_SPHERE: -0.50
OD_CYLINDER: -0.75
OS_CYLINDER: -0.75

## 2024-12-16 ASSESSMENT — CONFRONTATIONAL VISUAL FIELD TEST (CVF)
OS_FINDINGS: FULL
OD_FINDINGS: FULL

## 2024-12-16 ASSESSMENT — KERATOMETRY
OS_AXISANGLE_DEGREES: 151
OD_AXISANGLE_DEGREES: 022
OS_K1POWER_DIOPTERS: 43.55
OD_K2POWER_DIOPTERS: 45.24
OS_K2POWER_DIOPTERS: 44.76
OD_K1POWER_DIOPTERS: 43.49

## 2024-12-16 ASSESSMENT — LID POSITION - PTOSIS
OS_PTOSIS: LUL
OD_PTOSIS: RUL

## 2024-12-16 ASSESSMENT — TONOMETRY
OD_IOP_MMHG: 13
OS_IOP_MMHG: 13

## 2024-12-16 ASSESSMENT — VISUAL ACUITY
OS_BCVA: 20/40
OD_BCVA: 20/30-1

## 2024-12-16 ASSESSMENT — DRY EYES - PHYSICIAN NOTES
OS_GENERALCOMMENTS: L
OD_GENERALCOMMENTS: L

## 2024-12-16 ASSESSMENT — LID POSITION - COMMENTS: OD_COMMENTS: MILD

## 2025-02-04 ENCOUNTER — APPOINTMENT (OUTPATIENT)
Dept: GASTROENTEROLOGY | Facility: CLINIC | Age: 75
End: 2025-02-04
Payer: MEDICARE

## 2025-02-04 VITALS
HEART RATE: 90 BPM | DIASTOLIC BLOOD PRESSURE: 72 MMHG | OXYGEN SATURATION: 94 % | BODY MASS INDEX: 32.1 KG/M2 | SYSTOLIC BLOOD PRESSURE: 138 MMHG | HEIGHT: 61 IN | RESPIRATION RATE: 16 BRPM | WEIGHT: 170 LBS

## 2025-02-04 PROCEDURE — 99214 OFFICE O/P EST MOD 30 MIN: CPT

## 2025-02-12 ENCOUNTER — NON-APPOINTMENT (OUTPATIENT)
Age: 75
End: 2025-02-12

## 2025-03-03 ENCOUNTER — NON-APPOINTMENT (OUTPATIENT)
Age: 75
End: 2025-03-03

## 2025-03-06 ENCOUNTER — TRANSCRIPTION ENCOUNTER (OUTPATIENT)
Age: 75
End: 2025-03-06

## 2025-03-21 ENCOUNTER — OFFICE (OUTPATIENT)
Dept: URBAN - METROPOLITAN AREA CLINIC 104 | Facility: CLINIC | Age: 75
Setting detail: OPHTHALMOLOGY
End: 2025-03-21
Payer: MEDICARE

## 2025-03-21 DIAGNOSIS — Z96.1: ICD-10-CM

## 2025-03-21 DIAGNOSIS — H04.412: ICD-10-CM

## 2025-03-21 DIAGNOSIS — H02.403: ICD-10-CM

## 2025-03-21 DIAGNOSIS — H40.013: ICD-10-CM

## 2025-03-21 DIAGNOSIS — H16.222: ICD-10-CM

## 2025-03-21 DIAGNOSIS — H16.221: ICD-10-CM

## 2025-03-21 DIAGNOSIS — H16.223: ICD-10-CM

## 2025-03-21 DIAGNOSIS — H40.033: ICD-10-CM

## 2025-03-21 DIAGNOSIS — H26.493: ICD-10-CM

## 2025-03-21 PROCEDURE — 99213 OFFICE O/P EST LOW 20 MIN: CPT | Performed by: OPHTHALMOLOGY

## 2025-03-21 PROCEDURE — 83861 MICROFLUID ANALY TEARS: CPT | Mod: QW,LT | Performed by: OPHTHALMOLOGY

## 2025-03-21 PROCEDURE — 83861 MICROFLUID ANALY TEARS: CPT | Mod: QW,RT | Performed by: OPHTHALMOLOGY

## 2025-03-21 ASSESSMENT — SUPERFICIAL PUNCTATE KERATITIS (SPK)
OD_SPK: 2+
OS_SPK: 2+

## 2025-03-21 ASSESSMENT — REFRACTION_CURRENTRX
OD_VPRISM_DIRECTION: SV
OS_AXIS: 082
OS_VPRISM_DIRECTION: SV
OD_AXIS: 110
OS_AXIS: 070
OD_SPHERE: PLANO
OS_CYLINDER: -0.75
OD_VPRISM_DIRECTION: SV
OD_OVR_VA: 20/
OS_OVR_VA: 20/
OD_CYLINDER: -0.50
OS_SPHERE: +1.50
OD_CYLINDER: -0.50
OD_OVR_VA: 20/
OD_SPHERE: +2.50
OS_SPHERE: +3.50
OS_CYLINDER: -0.75
OS_OVR_VA: 20/
OS_VPRISM_DIRECTION: SV
OD_AXIS: 097

## 2025-03-21 ASSESSMENT — REFRACTION_MANIFEST
OS_CYLINDER: -0.75
OS_SPHERE: +1.50
OS_ADD: +2.50
OD_AXIS: 110
OD_ADD: +2.50
OS_AXIS: 090
OD_CYLINDER: -0.50
OD_SPHERE: -0.50
OD_VA1: 20/30-1
OS_VA1: 20/20

## 2025-03-21 ASSESSMENT — KERATOMETRY
OS_K2POWER_DIOPTERS: 44.70
OD_K1POWER_DIOPTERS: 43.05
OD_K2POWER_DIOPTERS: 45.30
OS_K1POWER_DIOPTERS: 43.55
OD_AXISANGLE_DEGREES: 020
OS_AXISANGLE_DEGREES: 157

## 2025-03-21 ASSESSMENT — LID EXAM ASSESSMENTS
OS_FRONTALIS_USE: 3+
OS_LATERAL_FAT_PROLAPSE: 2+
OD_COMMENTS: 2+ LATERAL
OD_CENTRAL_FAT_PROLAPSE: 2+
OD_LATERAL_FAT_PROLAPSE: 2+
OS_COMMENTS: 2+ MEDIAL
OD_MEDIAL_FAT_PROLAPSE: 2+
OS_COMMENTS: 2+ LATERAL
OD_MRD1: 2.5 MM
OD_COMMENTS: 2+ MEDIAL
OD_LEVATOR_FUNCTION: 18 MM
OD_COMMENTS: 2+ LATERAL FAT PROLAPS
OS_MRD1: 3.5 MM
OS_CENTRAL_FAT_PROLAPSE: 2+
OS_LEVATOR_FUNCTION: 18 MM
OS_COMMENTS: 2+ MEDIAL FAT PROLAPSE
OS_COMMENTS: 2+ LATERAL FAT PROLAPS

## 2025-03-21 ASSESSMENT — LID POSITION - PTOSIS
OS_PTOSIS: LUL
OD_PTOSIS: RUL

## 2025-03-21 ASSESSMENT — REFRACTION_AUTOREFRACTION
OS_AXIS: 062
OD_AXIS: 094
OS_SPHERE: +0.75
OD_SPHERE: -0.25
OS_CYLINDER: -2.00
OD_CYLINDER: -1.50

## 2025-03-21 ASSESSMENT — LID POSITION - COMMENTS: OD_COMMENTS: MILD

## 2025-03-21 ASSESSMENT — VISUAL ACUITY
OS_BCVA: 20/30-1
OD_BCVA: 20/20

## 2025-03-21 ASSESSMENT — CONFRONTATIONAL VISUAL FIELD TEST (CVF)
OD_FINDINGS: FULL
OS_FINDINGS: FULL

## 2025-04-12 ENCOUNTER — OFFICE (OUTPATIENT)
Dept: URBAN - METROPOLITAN AREA CLINIC 109 | Facility: CLINIC | Age: 75
Setting detail: OPHTHALMOLOGY
End: 2025-04-12
Payer: MEDICARE

## 2025-04-12 DIAGNOSIS — H00.011: ICD-10-CM

## 2025-04-12 PROCEDURE — 99213 OFFICE O/P EST LOW 20 MIN: CPT | Performed by: OPTOMETRIST

## 2025-04-12 ASSESSMENT — TONOMETRY
OS_IOP_MMHG: 19
OD_IOP_MMHG: 18

## 2025-04-12 ASSESSMENT — REFRACTION_MANIFEST
OS_CYLINDER: -0.75
OD_VA1: 20/30-1
OD_CYLINDER: -0.50
OS_AXIS: 090
OD_SPHERE: -0.50
OS_VA1: 20/20
OS_ADD: +2.50
OD_AXIS: 110
OD_ADD: +2.50
OS_SPHERE: +1.50

## 2025-04-12 ASSESSMENT — REFRACTION_AUTOREFRACTION
OD_AXIS: 102
OS_CYLINDER: -1.25
OS_SPHERE: +0.25
OS_AXIS: 47
OD_CYLINDER: -2.50
OD_SPHERE: +0.25

## 2025-04-12 ASSESSMENT — LID EXAM ASSESSMENTS
OS_COMMENTS: 2+ MEDIAL FAT PROLAPSE
OS_FRONTALIS_USE: 3+
OS_MRD1: 3.5 MM
OD_COMMENTS: 2+ MEDIAL
OS_COMMENTS: 2+ LATERAL FAT PROLAPS
OS_COMMENTS: 2+ LATERAL
OS_CENTRAL_FAT_PROLAPSE: 2+
OS_LATERAL_FAT_PROLAPSE: 2+
OD_CENTRAL_FAT_PROLAPSE: 2+
OD_MRD1: 2.5 MM
OD_LATERAL_FAT_PROLAPSE: 2+
OS_LEVATOR_FUNCTION: 18 MM
OD_COMMENTS: 2+ LATERAL FAT PROLAPS
OD_MEDIAL_FAT_PROLAPSE: 2+
OD_COMMENTS: 2+ LATERAL
OS_COMMENTS: 2+ MEDIAL
OD_LEVATOR_FUNCTION: 18 MM

## 2025-04-12 ASSESSMENT — SUPERFICIAL PUNCTATE KERATITIS (SPK)
OD_SPK: 2+
OS_SPK: 2+

## 2025-04-12 ASSESSMENT — CONFRONTATIONAL VISUAL FIELD TEST (CVF)
OS_FINDINGS: FULL
OD_FINDINGS: FULL

## 2025-04-12 ASSESSMENT — REFRACTION_CURRENTRX
OD_VPRISM_DIRECTION: SV
OD_SPHERE: +2.50
OS_CYLINDER: -0.75
OD_CYLINDER: -0.50
OS_AXIS: 070
OD_CYLINDER: -0.50
OD_VPRISM_DIRECTION: SV
OS_OVR_VA: 20/
OS_CYLINDER: -0.75
OS_SPHERE: +1.50
OD_SPHERE: PLANO
OS_VPRISM_DIRECTION: SV
OD_AXIS: 110
OD_OVR_VA: 20/
OS_SPHERE: +3.50
OS_OVR_VA: 20/
OS_VPRISM_DIRECTION: SV
OD_AXIS: 097
OD_OVR_VA: 20/
OS_AXIS: 082

## 2025-04-12 ASSESSMENT — VISUAL ACUITY
OS_BCVA: 20/100-
OD_BCVA: 20/25-2

## 2025-04-12 ASSESSMENT — LID POSITION - PTOSIS
OD_PTOSIS: RUL
OS_PTOSIS: LUL

## 2025-04-12 ASSESSMENT — KERATOMETRY
OD_K2POWER_DIOPTERS: 45.30
OS_AXISANGLE_DEGREES: 157
OD_K1POWER_DIOPTERS: 43.05
OS_K1POWER_DIOPTERS: 43.55
OS_K2POWER_DIOPTERS: 44.70
OD_AXISANGLE_DEGREES: 020

## 2025-04-12 ASSESSMENT — LID POSITION - COMMENTS: OD_COMMENTS: MILD

## 2025-04-16 ENCOUNTER — OFFICE (OUTPATIENT)
Dept: URBAN - METROPOLITAN AREA CLINIC 109 | Facility: CLINIC | Age: 75
Setting detail: OPHTHALMOLOGY
End: 2025-04-16
Payer: MEDICARE

## 2025-04-16 DIAGNOSIS — H00.011: ICD-10-CM

## 2025-04-16 PROCEDURE — 99212 OFFICE O/P EST SF 10 MIN: CPT | Performed by: OPTOMETRIST

## 2025-04-16 ASSESSMENT — KERATOMETRY
OD_AXISANGLE_DEGREES: 020
OS_K1POWER_DIOPTERS: 43.55
OD_K1POWER_DIOPTERS: 43.05
OD_K2POWER_DIOPTERS: 45.30
OS_AXISANGLE_DEGREES: 157
OS_K2POWER_DIOPTERS: 44.70

## 2025-04-16 ASSESSMENT — REFRACTION_CURRENTRX
OD_CYLINDER: -0.50
OS_SPHERE: +1.50
OS_AXIS: 070
OS_VPRISM_DIRECTION: SV
OS_OVR_VA: 20/
OD_VPRISM_DIRECTION: SV
OS_AXIS: 082
OS_VPRISM_DIRECTION: SV
OD_OVR_VA: 20/
OD_OVR_VA: 20/
OD_AXIS: 097
OS_CYLINDER: -0.75
OS_CYLINDER: -0.75
OD_SPHERE: PLANO
OD_SPHERE: +2.50
OS_SPHERE: +3.50
OD_VPRISM_DIRECTION: SV
OS_OVR_VA: 20/
OD_AXIS: 110
OD_CYLINDER: -0.50

## 2025-04-16 ASSESSMENT — REFRACTION_AUTOREFRACTION
OS_SPHERE: +0.25
OD_CYLINDER: -2.50
OS_AXIS: 47
OD_SPHERE: +0.25
OS_CYLINDER: -1.25
OD_AXIS: 102

## 2025-04-16 ASSESSMENT — LID EXAM ASSESSMENTS
OS_COMMENTS: 2+ LATERAL FAT PROLAPS
OD_CENTRAL_FAT_PROLAPSE: 2+
OS_MRD1: 3.5 MM
OS_FRONTALIS_USE: 3+
OD_COMMENTS: 2+ MEDIAL
OS_COMMENTS: 2+ MEDIAL
OD_LEVATOR_FUNCTION: 18 MM
OD_MRD1: 2.5 MM
OS_CENTRAL_FAT_PROLAPSE: 2+
OS_COMMENTS: 2+ MEDIAL FAT PROLAPSE
OS_LEVATOR_FUNCTION: 18 MM
OD_LATERAL_FAT_PROLAPSE: 2+
OD_COMMENTS: 2+ LATERAL FAT PROLAPS
OS_LATERAL_FAT_PROLAPSE: 2+
OD_COMMENTS: 2+ LATERAL
OD_MEDIAL_FAT_PROLAPSE: 2+
OS_COMMENTS: 2+ LATERAL

## 2025-04-16 ASSESSMENT — REFRACTION_MANIFEST
OD_SPHERE: -0.50
OD_ADD: +2.50
OD_VA1: 20/30-1
OS_SPHERE: +1.50
OD_CYLINDER: -0.50
OS_AXIS: 090
OS_CYLINDER: -0.75
OD_AXIS: 110
OS_ADD: +2.50
OS_VA1: 20/20

## 2025-04-16 ASSESSMENT — LID POSITION - COMMENTS: OD_COMMENTS: MILD

## 2025-04-16 ASSESSMENT — CONFRONTATIONAL VISUAL FIELD TEST (CVF)
OD_FINDINGS: FULL
OS_FINDINGS: FULL

## 2025-04-16 ASSESSMENT — LID POSITION - PTOSIS
OD_PTOSIS: RUL
OS_PTOSIS: LUL

## 2025-04-16 ASSESSMENT — VISUAL ACUITY
OD_BCVA: 20/25-2
OS_BCVA: 20/25-1

## 2025-04-16 ASSESSMENT — SUPERFICIAL PUNCTATE KERATITIS (SPK)
OD_SPK: 2+
OS_SPK: 2+

## 2025-05-20 ENCOUNTER — APPOINTMENT (OUTPATIENT)
Dept: NEUROLOGY | Facility: CLINIC | Age: 75
End: 2025-05-20
Payer: MEDICARE

## 2025-05-20 VITALS
BODY MASS INDEX: 32.1 KG/M2 | DIASTOLIC BLOOD PRESSURE: 82 MMHG | TEMPERATURE: 98.2 F | HEIGHT: 61 IN | HEART RATE: 89 BPM | WEIGHT: 170 LBS | SYSTOLIC BLOOD PRESSURE: 138 MMHG

## 2025-05-20 DIAGNOSIS — G31.84 MILD COGNITIVE IMPAIRMENT, SO STATED: ICD-10-CM

## 2025-05-20 DIAGNOSIS — G47.33 OBSTRUCTIVE SLEEP APNEA (ADULT) (PEDIATRIC): ICD-10-CM

## 2025-05-20 PROCEDURE — G2211 COMPLEX E/M VISIT ADD ON: CPT

## 2025-05-20 PROCEDURE — 99214 OFFICE O/P EST MOD 30 MIN: CPT

## 2025-05-28 ENCOUNTER — APPOINTMENT (OUTPATIENT)
Dept: MRI IMAGING | Facility: CLINIC | Age: 75
End: 2025-05-28
Payer: MEDICARE

## 2025-05-28 PROCEDURE — 70551 MRI BRAIN STEM W/O DYE: CPT

## 2025-05-30 ENCOUNTER — TRANSCRIPTION ENCOUNTER (OUTPATIENT)
Age: 75
End: 2025-05-30

## 2025-06-13 ENCOUNTER — OUTPATIENT (OUTPATIENT)
Dept: OUTPATIENT SERVICES | Facility: HOSPITAL | Age: 75
LOS: 1 days | End: 2025-06-13
Payer: MEDICARE

## 2025-06-13 DIAGNOSIS — G47.33 OBSTRUCTIVE SLEEP APNEA (ADULT) (PEDIATRIC): ICD-10-CM

## 2025-06-13 DIAGNOSIS — Z98.890 OTHER SPECIFIED POSTPROCEDURAL STATES: Chronic | ICD-10-CM

## 2025-06-13 DIAGNOSIS — Z85.828 PERSONAL HISTORY OF OTHER MALIGNANT NEOPLASM OF SKIN: Chronic | ICD-10-CM

## 2025-06-13 DIAGNOSIS — S52.122A DISPLACED FRACTURE OF HEAD OF LEFT RADIUS, INITIAL ENCOUNTER FOR CLOSED FRACTURE: Chronic | ICD-10-CM

## 2025-06-13 PROCEDURE — 95800 SLP STDY UNATTENDED: CPT

## 2025-06-13 PROCEDURE — G0400: CPT | Mod: 26

## 2025-06-18 ENCOUNTER — NON-APPOINTMENT (OUTPATIENT)
Age: 75
End: 2025-06-18

## 2025-06-24 ENCOUNTER — APPOINTMENT (OUTPATIENT)
Dept: GASTROENTEROLOGY | Facility: CLINIC | Age: 75
End: 2025-06-24

## 2025-07-31 ENCOUNTER — OFFICE (OUTPATIENT)
Dept: URBAN - METROPOLITAN AREA CLINIC 70 | Facility: CLINIC | Age: 75
Setting detail: OPHTHALMOLOGY
End: 2025-07-31
Payer: MEDICARE

## 2025-07-31 DIAGNOSIS — H04.412: ICD-10-CM

## 2025-07-31 DIAGNOSIS — H02.403: ICD-10-CM

## 2025-07-31 DIAGNOSIS — Z96.1: ICD-10-CM

## 2025-07-31 DIAGNOSIS — H16.223: ICD-10-CM

## 2025-07-31 DIAGNOSIS — H26.493: ICD-10-CM

## 2025-07-31 DIAGNOSIS — H40.033: ICD-10-CM

## 2025-07-31 DIAGNOSIS — H40.013: ICD-10-CM

## 2025-07-31 PROCEDURE — 92014 COMPRE OPH EXAM EST PT 1/>: CPT | Performed by: OPHTHALMOLOGY

## 2025-07-31 PROCEDURE — 92250 FUNDUS PHOTOGRAPHY W/I&R: CPT | Performed by: OPHTHALMOLOGY

## 2025-07-31 ASSESSMENT — REFRACTION_CURRENTRX
OS_AXIS: 070
OS_SPHERE: +3.50
OS_VPRISM_DIRECTION: SV
OD_SPHERE: +2.50
OS_CYLINDER: -0.75
OD_CYLINDER: -0.50
OD_AXIS: 110
OS_CYLINDER: -0.75
OD_OVR_VA: 20/
OD_VPRISM_DIRECTION: SV
OD_AXIS: 097
OS_OVR_VA: 20/
OD_VPRISM_DIRECTION: SV
OD_OVR_VA: 20/
OD_CYLINDER: -0.50
OS_OVR_VA: 20/
OS_SPHERE: +1.50
OS_VPRISM_DIRECTION: SV
OS_AXIS: 082
OD_SPHERE: PLANO

## 2025-07-31 ASSESSMENT — VISUAL ACUITY
OD_BCVA: 20/25-
OS_BCVA: 20/40

## 2025-07-31 ASSESSMENT — CONFRONTATIONAL VISUAL FIELD TEST (CVF)
OD_FINDINGS: FULL
OS_FINDINGS: FULL

## (undated) DEVICE — TUBE SPLINTING DISP

## (undated) DEVICE — BIOPSY FORCEP COLD DISP

## (undated) DEVICE — PACK IV START WITH CHG

## (undated) DEVICE — SALIVA EJECTOR (BLUE)

## (undated) DEVICE — BIOPSY FORCEP RADIAL JAW 4 STANDARD WITH NEEDLE

## (undated) DEVICE — TUBING IV SET GRAVITY 3Y 100" MACRO

## (undated) DEVICE — DRSG 2X2

## (undated) DEVICE — TUBING SUCTION NONCONDUCTIVE 6MM X 12FT

## (undated) DEVICE — BITE BLOCK ADULT 20 X 27MM (GREEN)

## (undated) DEVICE — LUBRICATING JELLY HR ONE SHOT 3G

## (undated) DEVICE — CATH HEMOSTAT GLD PROBE 7FR 300CM

## (undated) DEVICE — UNDERPAD LINEN SAVER 17 X 24"

## (undated) DEVICE — GOWN LG

## (undated) DEVICE — MARKER ENDO SPOT EX

## (undated) DEVICE — DRSG BANDAID 0.75X3"

## (undated) DEVICE — TUBING MEDI-VAC W MAXIGRIP CONNECTORS 1/4"X6'

## (undated) DEVICE — Device

## (undated) DEVICE — CLAMP BX HOT RAD JAW 3

## (undated) DEVICE — DRSG CURITY GAUZE SPONGE 4 X 4" 12-PLY NON-STERILE

## (undated) DEVICE — BASIN EMESIS 10IN GRADUATED MAUVE

## (undated) DEVICE — CATH IV SAFE BC 22G X 1" (BLUE)

## (undated) DEVICE — DENTURE CUP PINK

## (undated) DEVICE — ESU ERBE 044848: Type: DURABLE MEDICAL EQUIPMENT

## (undated) DEVICE — CONTAINER FORMALIN 80ML YELLOW

## (undated) DEVICE — ELCTR GROUNDING PAD ADULT COVIDIEN

## (undated) DEVICE — ELCTR ECG CONDUCTIVE ADHESIVE